# Patient Record
Sex: FEMALE | Race: WHITE | Employment: FULL TIME | URBAN - METROPOLITAN AREA
[De-identification: names, ages, dates, MRNs, and addresses within clinical notes are randomized per-mention and may not be internally consistent; named-entity substitution may affect disease eponyms.]

---

## 2017-01-20 ENCOUNTER — HOSPITAL ENCOUNTER (OUTPATIENT)
Dept: MAMMOGRAPHY | Facility: CLINIC | Age: 42
Discharge: HOME | End: 2017-01-20
Attending: INTERNAL MEDICINE | Admitting: INTERNAL MEDICINE
Payer: COMMERCIAL

## 2017-01-20 DIAGNOSIS — Z12.31 VISIT FOR SCREENING MAMMOGRAM: ICD-10-CM

## 2017-01-20 PROCEDURE — G0202 SCR MAMMO BI INCL CAD: HCPCS

## 2017-01-20 NOTE — PROGRESS NOTES
Quick Note:    This is to inform you regarding your test result.      Your mammogram result is satisfactory       Dr.Nasima Ivett MD,FACP    ______

## 2017-03-03 ENCOUNTER — OFFICE VISIT (OUTPATIENT)
Dept: FAMILY MEDICINE | Facility: CLINIC | Age: 42
End: 2017-03-03
Payer: COMMERCIAL

## 2017-03-03 VITALS
BODY MASS INDEX: 21.07 KG/M2 | TEMPERATURE: 96.8 F | WEIGHT: 139 LBS | OXYGEN SATURATION: 98 % | DIASTOLIC BLOOD PRESSURE: 77 MMHG | HEIGHT: 68 IN | RESPIRATION RATE: 16 BRPM | SYSTOLIC BLOOD PRESSURE: 114 MMHG | HEART RATE: 85 BPM

## 2017-03-03 DIAGNOSIS — L70.9 ACNE, UNSPECIFIED ACNE TYPE: Primary | ICD-10-CM

## 2017-03-03 DIAGNOSIS — L71.0 PERIORAL DERMATITIS: ICD-10-CM

## 2017-03-03 DIAGNOSIS — R79.89 ABNORMAL TSH: ICD-10-CM

## 2017-03-03 PROCEDURE — 84439 ASSAY OF FREE THYROXINE: CPT | Performed by: INTERNAL MEDICINE

## 2017-03-03 PROCEDURE — 99213 OFFICE O/P EST LOW 20 MIN: CPT | Performed by: INTERNAL MEDICINE

## 2017-03-03 PROCEDURE — 36415 COLL VENOUS BLD VENIPUNCTURE: CPT | Performed by: INTERNAL MEDICINE

## 2017-03-03 PROCEDURE — 84443 ASSAY THYROID STIM HORMONE: CPT | Performed by: INTERNAL MEDICINE

## 2017-03-03 PROCEDURE — 86376 MICROSOMAL ANTIBODY EACH: CPT | Performed by: INTERNAL MEDICINE

## 2017-03-03 RX ORDER — DOXYCYCLINE 100 MG/1
100 CAPSULE ORAL 2 TIMES DAILY
Qty: 28 CAPSULE | Refills: 1 | Status: SHIPPED | OUTPATIENT
Start: 2017-03-03 | End: 2017-03-17

## 2017-03-03 NOTE — PROGRESS NOTES
SUBJECTIVE:                                                    Domonique Masters is a 42 year old female who presents to clinic today for the following health issues:    Rash      Duration: 5+ weeks    Description  Location: Facial rash  Itching: mild    Intensity:  mild    Accompanying signs and symptoms: Redness; Very light itching after washing face; Burning-decreased once stopped using Retin-A; No swelling; No fever; Spreading rash    History (similar episodes/previous evaluation): None    Precipitating or alleviating factors:  New exposures:  Retin-A past 7 months-Stopped taking 9 days ago  Recent travel: no      Therapies tried and outcome: Aloe gel; Stopping Retin-A     Started using Retin-A July 2016 for mild acne.   Developed red rash-like patches on chin that spread to bilateral cheeks 1 month ago.    She scheduled an appointment with dermatology to evaluate this.   However they canceled her appointment and she was unable to reschedule.   Dermatology recommended her to discontinue Retin-A use.   Has never experienced this type of flare up before.   Wears the same tinted moisturizer for the past several years.   Has used desonide cream topically on face with some improvement.   Denies any exposure to new soaps or toothpaste.   Does not use benzoyl peroxide facial wash.   Has never taken doxycycline.     Occasionally has exacerbations on lips after eating a lot of sugar.   Mentions she feels more aggravated at work but denies any new stressors.   Able to sleep at night without issues.     Denies being pregnant or desire for pregnancy.     Problem list and histories reviewed & adjusted, as indicated.  Additional history: as documented    Patient Active Problem List   Diagnosis     Stricture and stenosis of esophagus     Abnormal glandular Papanicolaou smear of cervix     Benign hypertension     Anxiety     Elevated liver enzymes     Abnormal TSH     Benign essential tremor     CARDIOVASCULAR SCREENING; LDL GOAL  LESS THAN 130     GERD (gastroesophageal reflux disease)     IUD (intrauterine device) in place     Past Surgical History   Procedure Laterality Date     C appendectomy  1983     Gyn surgery       left ovarian cyst removed     Upper gi endoscopy performed       x2     Esophagoscopy, gastroscopy, duodenoscopy (egd), combined N/A 9/9/2014     Procedure: COMBINED ESOPHAGOSCOPY, GASTROSCOPY, DUODENOSCOPY (EGD), BIOPSY SINGLE OR MULTIPLE;  Surgeon: Gilberto Soto MD;  Location:  GI       Social History   Substance Use Topics     Smoking status: Never Smoker     Smokeless tobacco: Never Used     Alcohol use No      Comment: recovering alcoholic     Family History   Problem Relation Age of Onset     Genitourinary Problems Father      ESRD on dialysis     Cardiovascular Father      Cardiovascular Paternal Grandfather      Breast Cancer No family hx of      Cancer - colorectal No family hx of      Colon Cancer No family hx of          Current Outpatient Prescriptions   Medication Sig Dispense Refill     levonorgestrel (MIRENA) 20 MCG/24HR IUD 1 each by Intrauterine route once       LORazepam (ATIVAN) 0.5 MG tablet Take 1 tablet (0.5 mg) by mouth as needed 60 tablet 0     metoprolol (TOPROL-XL) 50 MG 24 hr tablet Take 1 tablet (50 mg) by mouth daily 90 tablet 3     MULTIVITAMIN OR 1 qd       LYSINE 1 qod       Allergies   Allergen Reactions     Retin-A [Tretinoin] Rash       ROS:  Constitutional, HEENT, cardiovascular, pulmonary, GI, , musculoskeletal, neuro, skin, endocrine and psych systems are negative, except as otherwise noted.    This document serves as a record of the services and decisions personally performed and made by Maria Elena Root MD. It was created on his/her behalf by Marta Aleman, a trained medical scribe. The creation of this document is based the provider's statements to the medical scribe.  Scribe Marta Aleman 3:31 PM, March 3, 2017    OBJECTIVE:                                                "     /77 (BP Location: Left arm, Patient Position: Left side, Cuff Size: Adult Regular)  Pulse 85  Temp 96.8  F (36  C) (Tympanic)  Resp 16  Ht 5' 8\" (1.727 m)  Wt 139 lb (63 kg)  LMP 02/25/2017  SpO2 98%  BMI 21.13 kg/m2  Body mass index is 21.13 kg/(m^2).  GENERAL: healthy, alert and no distress  SKIN: Small erythematous papules periorally and on cheeks    Diagnostic Test Results:  none      ASSESSMENT/PLAN:                                                    Domonique was seen today for derm problem.    Diagnoses and all orders for this visit:    Acne, unspecified acne type  Acne exacerbation likely secondary to Retin-A use.   Discontinue Retin-A which was recommended by her dermatologist also.  Discussed acne etiology and aggravating factors.   Steroid use not recommended - educated patient about this.   Recommended patient to complete entire doxycycline course.   Denies pregnancy or plan for future pregnency  If no improvement, will refer to dermatology.   -     doxycycline Monohydrate 100 MG CAPS; Take 1 capsule (100 mg) by mouth 2 times daily for 14 days    Perioral dermatitis  As above.   -     doxycycline Monohydrate 100 MG CAPS; Take 1 capsule (100 mg) by mouth 2 times daily for 14 days    Abnormal TSH  Patient has standing orders for TSH - completing labs today.   -     TSH with free T4 reflex  -     Thyroid peroxidase antibody          Patient Instructions   I am prescribing you an antibiotic.   Use benzoyl peroxide facial washes.   Discontinue use of Retin-A.   If your symptoms do not improve, call me and let me know so I can refer you to dermatology. They will accommodate you with my referral.            The information in this document, created by the medical scribe for me, accurately reflects the services I personally performed and the decisions made by me. I have reviewed and approved this document for accuracy prior to leaving the patient care area.  Maria Elena Root MD  3:31 PM, " 03/03/17    Maria Elena Root MD  [facility]

## 2017-03-03 NOTE — NURSING NOTE
"Chief Complaint   Patient presents with     Derm Problem       Initial /77 (BP Location: Left arm, Patient Position: Left side, Cuff Size: Adult Regular)  Pulse 85  Temp 96.8  F (36  C) (Tympanic)  Resp 16  Ht 5' 8\" (1.727 m)  Wt 139 lb (63 kg)  LMP 02/25/2017  SpO2 98%  BMI 21.13 kg/m2 Estimated body mass index is 21.13 kg/(m^2) as calculated from the following:    Height as of this encounter: 5' 8\" (1.727 m).    Weight as of this encounter: 139 lb (63 kg).  Medication Reconciliation: complete   Keeley Almonte CMA (AAMA)      "

## 2017-03-03 NOTE — MR AVS SNAPSHOT
After Visit Summary   3/3/2017    Domonique Masters    MRN: [id number]           Patient Information     Date Of Birth          1975        Visit Information        Provider Department      3/3/2017 3:30 PM Maria Elena Root MD [facility]        Today's Diagnoses     Acne, unspecified acne type    -  1    Perioral dermatitis          Care Instructions    I am prescribing you an antibiotic.   Use benzoyl peroxide facial washes.   Discontinue use of Retin-A.   If your symptoms do not improve, call me and let me know so I can refer you to dermatology. They will accommodate you with my referral.       Acne  Acne is an inflammatory condition of the skin. During adolescence (especially in boys) there is an increase in production of skin oils on the face, neck, chest, upper back, and upper arms. These oils can block hair follicles and allow an overgrowth of normal bacteria. This results in acne.  Mild acne causes whiteheads, blackheads, and pimples. More severe acne causes cysts and scarring. Acne usually clears up by your mid-20 s.  These factors can make acne worse:    Oil-based cosmetics    Heavy sweating    Frequent or hard scrubbing of the skin can irritate the acne lesions    Skin rubbing against helmets, shoulder pads, turtlenecks, and bra straps    Certain types of birth control pills  Home care  Here are some tips to help care for acne:  1. Treatments may include topical products (creams, lotions, or gels), oral antibiotics, and other medicines.  2. Follow the treatment plan advised by your doctor. It usually takes 2 to 3 months to see a result.  3. Switching from oil-based to water-based makeup may improve acne in girls.  Follow-up care  Follow up with your doctor or as advised by our staff. For more information:    American Academy of Dermatology  www.skincarephysicians.com/acnenet/acne.html  When to seek medical care  Get medical attention if you have acne cysts that get larger or more  painful.    7878-2165 [employer]. [address] [place] [address], [place] [address]. All rights reserved. This information is not intended as a substitute for professional medical care. Always follow your healthcare professional's instructions.              Follow-ups after your visit        Your next 10 appointments already scheduled     Mar 03, 2017  4:15 PM CST   LAB with CS LAB   [facility] [place] ([facility])    [address] [facility] [address] [phone]           Patient must bring picture ID.  Patient should be prepared to give a urine specimen  Please do not eat 10-12 hours before your appointment if you are coming in fasting for labs on lipids, cholesterol, or glucose (sugar).  Pregnant women should follow their Care Team instructions. Water with medications is okay. Do not drink coffee or other fluids.   If you have concerns about taking  your medications, please ask at office or if scheduling via [employer], send a message by clicking on Secure Messaging, Message Your Care Team.              Who to contact     If you have questions or need follow up information about today's clinic visit or your schedule please contact [facility] directly at [phone].  Normal or non-critical lab and imaging results will be communicated to you by [employer], letter or phone within 4 business days after the clinic has received the results. If you do not hear from us within 7 days, please contact the clinic through [employer] or phone. If you have a critical or abnormal lab result, we will notify you by phone as soon as possible.  Submit refill requests through [employer] or call your pharmacy and they will forward the refill request to us. Please allow 3 business days for your refill to be completed.          Additional Information About Your Visit        [employer] Information     [employer] gives you secure access to your electronic health record. If you see a primary care  "provider, you can also send messages to your care team and make appointments. If you have questions, please call your primary care clinic.  If you do not have a primary care provider, please call [phone] and they will assist you.        Care EveryWhere ID     This is your Care EveryWhere ID. This could be used by other organizations to access your [facility] medical records  [id number]        Your Vitals Were     Pulse Temperature Respirations Height Last Period Pulse Oximetry    85 96.8  F (36  C) (Tympanic) 16 5' 8\" (1.727 m) 02/25/2017 98%    BMI (Body Mass Index)                   21.13 kg/m2            Blood Pressure from Last 3 Encounters:   03/03/17 114/77   12/08/16 117/75   12/02/15 106/67    Weight from Last 3 Encounters:   03/03/17 139 lb (63 kg)   12/08/16 145 lb (65.8 kg)   12/02/15 143 lb 8 oz (65.1 kg)              Today, you had the following     No orders found for display         Today's Medication Changes          These changes are accurate as of: 3/3/17  3:44 PM.  If you have any questions, ask your nurse or doctor.               Start taking these medicines.        Dose/Directions    doxycycline Monohydrate 100 MG Caps   Used for:  Acne, unspecified acne type, Perioral dermatitis   Started by:  Maria Elena Root MD        Dose:  100 mg   Take 1 capsule (100 mg) by mouth 2 times daily for 14 days   Quantity:  28 capsule   Refills:  1            Where to get your medicines      These medications were sent to [id number] - [place], [place] - [address] AT [facility] [address]  [facility] [address] [place] [address]    Hours:  24-hours Phone:  [phone]     doxycycline Monohydrate 100 MG Caps                Primary Care Provider Office Phone # Fax #    Maria Elena Root [phone]       [facility]    [address]  [facility] [address]        Thank you!     Thank you for choosing [facility]  for your " care. Our goal is always to provide you with excellent care. Hearing back from our patients is one way we can continue to improve our services. Please take a few minutes to complete the written survey that you may receive in the mail after your visit with us. Thank you!             Your Updated Medication List - Protect others around you: Learn how to safely use, store and throw away your medicines at www.disposemymeds.org.          This list is accurate as of: 3/3/17  3:44 PM.  Always use your most recent med list.                   Brand Name Dispense Instructions for use    doxycycline Monohydrate 100 MG Caps     28 capsule    Take 1 capsule (100 mg) by mouth 2 times daily for 14 days       levonorgestrel 20 MCG/24HR IUD    MIRENA     1 each by Intrauterine route once       LORazepam 0.5 MG tablet    ATIVAN    60 tablet    Take 1 tablet (0.5 mg) by mouth as needed       LYSINE      1 qod       metoprolol 50 MG 24 hr tablet    TOPROL-XL    90 tablet    Take 1 tablet (50 mg) by mouth daily       MULTIVITAMIN PO      1 qd

## 2017-03-03 NOTE — PATIENT INSTRUCTIONS
I am prescribing you an antibiotic.   Use benzoyl peroxide facial washes.   Discontinue use of Retin-A.   If your symptoms do not improve, call me and let me know so I can refer you to dermatology. They will accommodate you with my referral.       Acne  Acne is an inflammatory condition of the skin. During adolescence (especially in boys) there is an increase in production of skin oils on the face, neck, chest, upper back, and upper arms. These oils can block hair follicles and allow an overgrowth of normal bacteria. This results in acne.  Mild acne causes whiteheads, blackheads, and pimples. More severe acne causes cysts and scarring. Acne usually clears up by your mid-20 s.  These factors can make acne worse:    Oil-based cosmetics    Heavy sweating    Frequent or hard scrubbing of the skin can irritate the acne lesions    Skin rubbing against helmets, shoulder pads, turtlenecks, and bra straps    Certain types of birth control pills  Home care  Here are some tips to help care for acne:  1. Treatments may include topical products (creams, lotions, or gels), oral antibiotics, and other medicines.  2. Follow the treatment plan advised by your doctor. It usually takes 2 to 3 months to see a result.  3. Switching from oil-based to water-based makeup may improve acne in girls.  Follow-up care  Follow up with your doctor or as advised by our staff. For more information:    American Academy of Dermatology  www.skincarephysicians.com/acnenet/acne.html  When to seek medical care  Get medical attention if you have acne cysts that get larger or more painful.    1392-6618 [employer]. [address] [place] [address], [place] [address]. All rights reserved. This information is not intended as a substitute for professional medical care. Always follow your healthcare professional's instructions.

## 2017-03-06 LAB
T4 FREE SERPL-MCNC: 1.02 NG/DL (ref 0.76–1.46)
TSH SERPL DL<=0.005 MIU/L-ACNC: 4.91 MU/L (ref 0.4–4)

## 2017-03-07 LAB — THYROPEROXIDASE AB SERPL-ACNC: <10 IU/ML

## 2017-03-08 NOTE — PROGRESS NOTES
This is to inform you regarding your test result.    TSH which is thyroid hormone is elevated but free T 4 level is normal  You do not need medication at this point.  But we need to check your level again in 6 months  Anti TPO antibodies is negative       Dr.Nasima Ivett MD,FACP

## 2017-05-16 ENCOUNTER — OFFICE VISIT (OUTPATIENT)
Dept: FAMILY MEDICINE | Facility: CLINIC | Age: 42
End: 2017-05-16
Attending: INTERNAL MEDICINE
Payer: COMMERCIAL

## 2017-05-16 DIAGNOSIS — D18.01 CAPILLARY HEMANGIOMA OF SKIN: ICD-10-CM

## 2017-05-16 DIAGNOSIS — L70.0 ACNE VULGARIS: ICD-10-CM

## 2017-05-16 DIAGNOSIS — D48.5 NEOPLASM OF UNCERTAIN BEHAVIOR OF SKIN: ICD-10-CM

## 2017-05-16 DIAGNOSIS — Z85.828 HISTORY OF BASAL CELL CARCINOMA: ICD-10-CM

## 2017-05-16 DIAGNOSIS — L81.4 SOLAR LENTIGO: ICD-10-CM

## 2017-05-16 DIAGNOSIS — Z12.83 SKIN CANCER SCREENING: Primary | ICD-10-CM

## 2017-05-16 DIAGNOSIS — Z80.8 FAMILY HISTORY OF SKIN CANCER: ICD-10-CM

## 2017-05-16 DIAGNOSIS — D22.9 MULTIPLE BENIGN MELANOCYTIC NEVI: ICD-10-CM

## 2017-05-16 DIAGNOSIS — C44.712 BASAL CELL CARCINOMA OF RIGHT THIGH: ICD-10-CM

## 2017-05-16 DIAGNOSIS — L91.0 KELOID SCAR: ICD-10-CM

## 2017-05-16 PROCEDURE — 11300 SHAVE SKIN LESION 0.5 CM/<: CPT | Performed by: FAMILY MEDICINE

## 2017-05-16 PROCEDURE — 99000 SPECIMEN HANDLING OFFICE-LAB: CPT | Performed by: FAMILY MEDICINE

## 2017-05-16 PROCEDURE — 88305 TISSUE EXAM BY PATHOLOGIST: CPT | Performed by: FAMILY MEDICINE

## 2017-05-16 PROCEDURE — 99213 OFFICE O/P EST LOW 20 MIN: CPT | Mod: 25 | Performed by: FAMILY MEDICINE

## 2017-05-16 RX ORDER — SPIRONOLACTONE 50 MG/1
50 TABLET, FILM COATED ORAL DAILY
Qty: 90 TABLET | Refills: 3 | Status: CANCELLED | OUTPATIENT
Start: 2017-05-16

## 2017-05-16 NOTE — PATIENT INSTRUCTIONS
"FUTURE APPOINTMENTS  Follow up in 6 month(s) for a full-body skin cancer screening.  Follow up per pathology report.    Continue current acne cleansing regimen. Return to clinic if acne control becomes unsatisfactory.    WOUND CARE INSTRUCTIONS  1. After 24 hours, change dressing daily.  2. Wash hands before every dressing change.  3. Wash the wound area with a mild soap, then rinse  4. Gently pat dry with a sterile gauze or Q-tip.  5. Apply Vaseline or Aquaphor only over entire wound. Do NOT use Neosporin - as many people react to neomycin.  6. Finally, cover with a bandage or sterile non-stick gauze with micropore paper tape.  7. Repeat once daily until wound has healed.    Do not let the wound dry out.  The wound will heal faster and with better cosmetic results if routinely kept moist with step #5. It is a false belief that a wound heals better when it is exposed to air and allowed to dry out.      Soap, water and shampoo will not hurt this area.    Do not go swimming or take baths, but showering is encouraged.    Limit use of the area where the procedure was done for a few days to allow for optimal healing.    If you experience bleeding:  Repeat steps #2-5 and hold firm pressure on the area for 10 minutes without checking to see if the bleeding has stopped. \"Checking\" pulls off the protective wound clot and restarts the bleeding all over again. Re-apply pressure for 10 minutes if necessary to stop bleeding.  Use additional sterile gauze and tape to maintain pressure once bleeding has stopped.    Signs of Infection:  Infection can occur in any area where skin has been disrupted.  If you notice persistent redness, swelling, colored drainage, increasing pain, fever or other signs of infection, please call us at: [phone] and ask to have me or my colleague paged. We will call you back to discuss.    Pathology Results:  You will be notified, generally via letter or MyChart, in approximately 10 days. If there " is anything we need to discuss or further treatment needed, I will call you to discuss it.    Skin tissue can be some of the most challenging tissue for pathologists to examine, therefore we may use a specialist outside the [facility] [employer] system to read certain submitted tissue samples. When submitted to these outside specialists, [employer] will notify you that your tissue sample result has returned. However, this only means that the tissue sample has been sent to the specialist. These results are not available in [employer], so I will contact you when I receive the final report.    PATIENT INFORMATION : WOUNDS  During the healing process you will notice a number of changes. All wounds develop a small halo of redness surrounding the wound.  This means healing is occurring. Severe itching with extensive redness usually indicates sensitivity to the ointment or bandage tape used to dress the wound.  You should call our office if this develops.      Swelling  and/or discoloration around your surgical site is common, particularly when performed around the eye.    All wounds normally drain.  The larger the wound the more drainage there will be.  After 7-10 days, you will notice the wound beginning to shrink and new skin will begin to grow.  The wound is healed when you can see skin has formed over the entire area.  A healed wound has a healthy, shiny look to the surface and is red to dark pink in color to normalize.  Wounds may take approximately 4-6 weeks to heal.  Larger wounds may take 6-8 weeks. After the wound is healed you may discontinue dressing changes.    You may experience a sensation of tightness as your wound heals. This is normal and will gradually subside.    Your healed wound may be sensitive to temperature changes. This sensitivity improves with time, but if you re having a lot of discomfort, try to avoid temperature extremes.    Patients frequently experience itching after their wound appears to have healed because  "of the continue healing under the skin.  Plain Vaseline will help relieve the itching.    TIPS FOR AVOIDING SKIN CANCER AND PREMATURE SKIN AGING  DOs    Wear a wide-brimmed hat and sunglasses.     Wear sun-protective clothing.    [employer] and other [employer] To make sun protective clothing that is stylish, comfortable and cool.    [employer] and other [employer] To make UV arm sleeves suitable for golfing, gardening and other activities.      Wear sunscreen on your face every day, even in the winter. (UVA \"aging rays\" penetrates window glass and is just as strong in the winter as in the summer) Sunscreen with SPF > 30 is recommended.    Wear sunscreen on your body and re-apply every 2 hours when exposed to sun. Sunscreen with SPF > 50 is recommended.    You should use about 3 tablespoons of sunscreen to protect your whole body. Thus a typical eight ounce bottle of sunscreen should last 4 applications. Remember, that the SPF rating is compromised if you don t apply enough. Most people only apply 1/2 - 1/3 of the amount they need. Also don t forget areas such as your ears, feet, upper back and harder to reach places. Keep in mind that these amounts should be increased for larger body sizes.    Note that spray sunscreens are only for touch-up application, not as a base layer. Also, use spray sunscreens with caution around small children due to inhalation risk.    Product Recommendations:    Look for broad spectrum sunscreen (blocks both UVA and UVB).    Look for titanium dioxide and/or zinc oxide in the active ingredients, which are physical blockers as opposed to chemical blockers. Chemical-free sunscreens should not irritate the skin.    Good examples include: Blue Lizard, EltaMD, Vanicream, Solbar, CeraVe.     For sensitive skin, consider : Skin[employer]ica Essential Defense Mineral Shield Broad Spectrum SPF 35      Avoid combination products that include both sunscreen and insect repellant, as sunscreen should be " "applied every 2 hours, but insect repellant should not be applied as frequently.    Avoid products that include oxybenzone or retinyl palmitate.    For more information:  http://www.skincancer.org/prevention/sun-protection/sunscreen/sunscreens-safe-and-effective    DON'Ts    All tanning damages the skin. Aim for ivory skin year round and you will have less trouble with your skin in years to come. There is no merit in getting \"a base tan\" before a warm weather vacation, as any tanning indicates your body's response to sun damage.    Never use tanning beds. Tanning beds are associated with much higher risks of skin cancer.    Avoid mid-day sunshine (10 AM to 3 PM), if possible.    Stop smoking. Smokers have higher rates of skin cancer and also have premature skin wrinkling.    SKIN CANCER SELF-EXAM INSTRUCTIONS  Check every month in the mirror or with a household member. Be aware of any changes, especially bleeding or tenderness. Also, make sure to check your nails for color changes after removal of nail polish.    For melanoma, check for:  A - Asymmetry. One half unlike the other half.  B - Border. Irregular, scalloped, ragged, notched, blurred or poorly defined borders.  C - Color. Color variations from one area to another, with shades of tan, brown and/or black present. Sometimes white, red or blue.  D - Diameter. Greater than 6 mm (about the size of a pencil eraser). Any new growth of a mole should be concerning and be evaluated.  E - Evolving. A mole or skin lesion that looks different from the rest or is changing in size, shape or color.    For basal cell carcinoma and squamous cell carcinoma, check for:    Sores, shiny bumps, nodules, scaly lesions, or wart-like growths that are itchy, tender, crusting, scabbing, eroding, oozing or bleeding.    Open sores/wounds or reddish/irritated areas that do not heal within 2-3 weeks.    Scar-like areas that are white, yellow or waxy in color.  "

## 2017-05-16 NOTE — MR AVS SNAPSHOT
"              After Visit Summary   5/16/2017    Domonique Masters    MRN: [id number]           Patient Information     Date Of Birth          1975        Visit Information        Provider Department      5/16/2017 2:20 PM Joanna Mullins MD [facility] - Primary Care Skin        Today's Diagnoses     Skin cancer screening    -  1    Neoplasm of uncertain behavior of skin        Acne vulgaris        History of basal cell carcinoma        Family history of skin cancer        Capillary hemangioma of skin        Multiple benign melanocytic nevi        Keloid scar        Solar lentigo          Care Instructions    FUTURE APPOINTMENTS  Follow up in 1 year(s) for a full-body skin cancer screening.  Follow up per pathology report.    Continue current acne cleansing regimen. Return to clinic if acne control becomes unsatisfactory.    WOUND CARE INSTRUCTIONS  1. After 24 hours, change dressing daily.  2. Wash hands before every dressing change.  3. Wash the wound area with a mild soap, then rinse  4. Gently pat dry with a sterile gauze or Q-tip.  5. Apply Vaseline or Aquaphor only over entire wound. Do NOT use Neosporin - as many people react to neomycin.  6. Finally, cover with a bandage or sterile non-stick gauze with micropore paper tape.  7. Repeat once daily until wound has healed.    Do not let the wound dry out.  The wound will heal faster and with better cosmetic results if routinely kept moist with step #5. It is a false belief that a wound heals better when it is exposed to air and allowed to dry out.      Soap, water and shampoo will not hurt this area.    Do not go swimming or take baths, but showering is encouraged.    Limit use of the area where the procedure was done for a few days to allow for optimal healing.    If you experience bleeding:  Repeat steps #2-5 and hold firm pressure on the area for 10 minutes without checking to see if the bleeding has stopped. \"Checking\" pulls off the " protective wound clot and restarts the bleeding all over again. Re-apply pressure for 10 minutes if necessary to stop bleeding.  Use additional sterile gauze and tape to maintain pressure once bleeding has stopped.    Signs of Infection:  Infection can occur in any area where skin has been disrupted.  If you notice persistent redness, swelling, colored drainage, increasing pain, fever or other signs of infection, please call us at: [phone] and ask to have me or my colleague paged. We will call you back to discuss.    Pathology Results:  You will be notified, generally via letter or MyChart, in approximately 10 days. If there is anything we need to discuss or further treatment needed, I will call you to discuss it.    Skin tissue can be some of the most challenging tissue for pathologists to examine, therefore we may use a specialist outside the [facility] system to read certain submitted tissue samples. When submitted to these outside specialists, [facility] will notify you that your tissue sample result has returned. However, this only means that the tissue sample has been sent to the specialist. These results are not available in [facility], so I will contact you when I receive the final report.    PATIENT INFORMATION : WOUNDS  During the healing process you will notice a number of changes. All wounds develop a small halo of redness surrounding the wound.  This means healing is occurring. Severe itching with extensive redness usually indicates sensitivity to the ointment or bandage tape used to dress the wound.  You should call our office if this develops.      Swelling  and/or discoloration around your surgical site is common, particularly when performed around the eye.    All wounds normally drain.  The larger the wound the more drainage there will be.  After 7-10 days, you will notice the wound beginning to shrink and new skin will begin to grow.  The wound is healed when you can see skin has formed over the  "entire area.  A healed wound has a healthy, shiny look to the surface and is red to dark pink in color to normalize.  Wounds may take approximately 4-6 weeks to heal.  Larger wounds may take 6-8 weeks. After the wound is healed you may discontinue dressing changes.    You may experience a sensation of tightness as your wound heals. This is normal and will gradually subside.    Your healed wound may be sensitive to temperature changes. This sensitivity improves with time, but if you re having a lot of discomfort, try to avoid temperature extremes.    Patients frequently experience itching after their wound appears to have healed because of the continue healing under the skin.  Plain Vaseline will help relieve the itching.    TIPS FOR AVOIDING SKIN CANCER AND PREMATURE SKIN AGING  DOs    Wear a wide-brimmed hat and sunglasses.     Wear sun-protective clothing.    [employer] and other [employer] make sun protective clothing that is stylish, comfortable and cool.    [employer] and other Mr. Number make UV arm sleeves suitable for golfing, gardening and other activities.      Wear sunscreen on your face every day, even in the winter. (UVA \"aging rays\" penetrates window glass and is just as strong in the winter as in the summer) Sunscreen with SPF > 30 is recommended.    Wear sunscreen on your body and re-apply every 2 hours when exposed to sun. Sunscreen with SPF > 50 is recommended.    You should use about 3 tablespoons of sunscreen to protect your whole body. Thus a typical eight ounce bottle of sunscreen should last 4 applications. Remember, that the SPF rating is compromised if you don t apply enough. Most people only apply 1/2 - 1/3 of the amount they need. Also don t forget areas such as your ears, feet, upper back and harder to reach places. Keep in mind that these amounts should be increased for larger body sizes.    Note that spray sunscreens are only for touch-up application, not as a base layer. " "Also, use spray sunscreens with caution around small children due to inhalation risk.    Product Recommendations:    Look for broad spectrum sunscreen (blocks both UVA and UVB).    Look for titanium dioxide and/or zinc oxide in the active ingredients, which are physical blockers as opposed to chemical blockers. Chemical-free sunscreens should not irritate the skin.    Good examples include: Blue Lizard, EltaMD, Vanicream, Solbar, CeraVe.     For sensitive skin, consider : SkinMedica Essential Defense Mineral Shield Broad Spectrum SPF 35      Avoid combination products that include both sunscreen and insect repellant, as sunscreen should be applied every 2 hours, but insect repellant should not be applied as frequently.    Avoid products that include oxybenzone or retinyl palmitate.    For more information:  http://www.skincancer.org/prevention/sun-protection/sunscreen/sunscreens-safe-and-effective    DON'Ts    All tanning damages the skin. Aim for ivory skin year round and you will have less trouble with your skin in years to come. There is no merit in getting \"a base tan\" before a warm weather vacation, as any tanning indicates your body's response to sun damage.    Never use tanning beds. Tanning beds are associated with much higher risks of skin cancer.    Avoid mid-day sunshine (10 AM to 3 PM), if possible.    Stop smoking. Smokers have higher rates of skin cancer and also have premature skin wrinkling.    SKIN CANCER SELF-EXAM INSTRUCTIONS  Check every month in the mirror or with a household member. Be aware of any changes, especially bleeding or tenderness. Also, make sure to check your nails for color changes after removal of nail polish.    For melanoma, check for:  A - Asymmetry. One half unlike the other half.  B - Border. Irregular, scalloped, ragged, notched, blurred or poorly defined borders.  C - Color. Color variations from one area to another, with shades of tan, brown and/or black present. Sometimes " white, red or blue.  D - Diameter. Greater than 6 mm (about the size of a pencil eraser). Any new growth of a mole should be concerning and be evaluated.  E - Evolving. A mole or skin lesion that looks different from the rest or is changing in size, shape or color.    For basal cell carcinoma and squamous cell carcinoma, check for:    Sores, shiny bumps, nodules, scaly lesions, or wart-like growths that are itchy, tender, crusting, scabbing, eroding, oozing or bleeding.    Open sores/wounds or reddish/irritated areas that do not heal within 2-3 weeks.    Scar-like areas that are white, yellow or waxy in color.        Follow-ups after your visit        Who to contact     If you have questions or need follow up information about today's clinic visit or your schedule please contact [facility] - PRIMARY CARE SKIN directly at [phone].  Normal or non-critical lab and imaging results will be communicated to you by [employer], letter or phone within 4 business days after the clinic has received the results. If you do not hear from us within 7 days, please contact the clinic through [employer] or phone. If you have a critical or abnormal lab result, we will notify you by phone as soon as possible.  Submit refill requests through [facility] or call your pharmacy and they will forward the refill request to us. Please allow 3 business days for your refill to be completed.          Additional Information About Your Visit        [employer] Information     [facility] gives you secure access to your electronic health record. If you see a primary care provider, you can also send messages to your care team and make appointments. If you have questions, please call your primary care clinic.  If you do not have a primary care provider, please call [phone] and they will assist you.        Care EveryWhere ID     This is your Care EveryWhere ID. This could be used by other organizations to access your [facility]  records  [id number]         Blood Pressure from Last 3 Encounters:   03/03/17 114/77   12/08/16 117/75   12/02/15 106/67    Weight from Last 3 Encounters:   03/03/17 139 lb (63 kg)   12/08/16 145 lb (65.8 kg)   12/02/15 143 lb 8 oz (65.1 kg)              Today, you had the following     No orders found for display       Primary Care Provider Office Phone # Fax #    Maria Elena LUPIS Root [phone]       [facility]    [address]  [place]                [place] [address]        Thank you!     Thank you for choosing [facility] - PR[facility]  for your care. Our goal is always to provide you with excellent care. Hearing back from our patients is one way we can continue to improve our services. Please take a few minutes to complete the written survey that you may receive in the mail after your visit with us. Thank you!             Your Updated Medication List - Protect others around you: Learn how to safely use, store and throw away your medicines at www.disposemymeds.org.          This list is accurate as of: 5/16/17  2:44 PM.  Always use your most recent med list.                   Brand Name Dispense Instructions for use    levonorgestrel 20 MCG/24HR IUD    MIRENA     1 each by Intrauterine route once       LORazepam 0.5 MG tablet    ATIVAN    60 tablet    Take 1 tablet (0.5 mg) by mouth as needed       LYSINE      1 qod       metoprolol 50 MG 24 hr tablet    TOPROL-XL    90 tablet    Take 1 tablet (50 mg) by mouth daily       MULTIVITAMIN PO      1 qd

## 2017-05-16 NOTE — PROGRESS NOTES
[facility] - PRIMARY CARE SKIN    CC : skin cancer screening (full-body)  SUBJECTIVE:                                                    Domonique Masters is a 42 year old female who presents to clinic today for a full-body skin exam because of changing lesions.    Bothersome lesions noticed by the patient or other skin concerns :  Issue One : Lesions on legs are dry. One lesion on the left leg has been growing in size; present for at least 25 years. A pink lesion is noticed on the right thigh.  Enlarging : YES - one is enlarging.  Bleeding : NO  Itchy or irritating : NO.  Pain or tenderness : NO - the pink lesion is never sore nor tender, but it is more noticeable in the winter.  Changing color : YES - one is pink in color.    Personal history of skin cancer : YES - basal cell carcinoma on chest.  Family history of skin cancer : YES - squamous cell carcinoma in grandmother.    Sun Exposure History  Previous history of significant sun exposure: grew up in [place]  Blistering sunburns : YES - 10 times  Tanning beds : YES.  Sunscreen Use : YES, SPF : 15.  UV-protective clothing use : NO  Wide-brimmed hats : NO  UV-protective sunglasses : YES  Avoids mid-day sun : NO    Issue Two : Acne  Domonique Masters is a 42 year old female who presents to clinic today because of problems with acne. She has an IUD Mirena placed.She is currently satisfied with her acne control and will continue with the current treatment.    Symptoms have been ongoing for : years.  The acne is primarily located on the : face.  Nodular acne occasionally forms.    Current treatment : OTC Benzoyl peroxide.  Response to treatment : acne control is satisfactory at this time.  Side effects noted : Excessive skin irritation with previous use of tretinoin.    Previous treatments include : Topical retinoids, oral antibiotics.    Skin type : combination of oily/dry  Family history of acne : YES - in mother.  Risk factors for acne : None.    Occupation :  [employer] (both indoor & outdoor).    Refer to electronic medical record (EMR) for past medical history and medications.    INTEGUMENTARY/SKIN: POSITIVE for changing lesions  ROS : 14 point review of systems was negative except the symptoms listed above in the HPI.    This document serves as a record of the services and decisions personally performed and made by Xochitl Mullins MD. It was created on her behalf by Nii Jacobs, a trained medical scribe.  The creation of this document is based on the scribe's personal observations and the provider's statements to the medical scribe.  Nii Jacobs, May 16, 2017 2:21 PM      OBJECTIVE:                                                    GENERAL: healthy, alert and no distress  SKIN: Berger Skin Type - I.  This patient was examined from the top of the head to the bottom of the feet  including scalp, face, neck, back, chest, breasts, buttocks, both arms, both legs, both hands, both feet, all 10 fingers and all 10 toes. The dermatoscope was used to help evaluate pigmented lesions.  Skin Pertinent Findings:  Arms : Multiple 2 mm - 4 mm in size, brown macules most consistent with benign nevi (melanocytic nevi). Multiple, brown, macule(s) most consistent with benign solar lentigo.  Face : few resolving inflammatory papules and single nodule  Mid chest : Keloid scar    Chest, Abdomen : Multiple, 2 mm - 6 mm in size, brown macules most consistent with benign nevi (melanocytic nevi). Scattered, slightly raised, red lesion(s) consistent with capillary hemangioma.    Legs : Multiple, 2 mm - 4 mm in size, brown macules most consistent with benign nevi (melanocytic nevi).    Left dorsal third toe : 3 mm in size, brown macule most consistent with a benign nevus.    Left thigh, midline, 8 cm superior to patella : 10 mm in size, raised, scaly lesion.    Back : Multiple, 2 mm - 6 mm in size, brown macules most consistent with benign nevi (melanocytic nevi).    Significant  "Findings:  Right anterior mid-thigh, 16 cm superior to edge of patella : 3 mm in size, erythematous, flat lesion. ? Basal cell carcinoma ? Squamous cell carcinoma ? Other    Face : Clear, no acneiform lesions.    Consent for digital photography  The patient was advised that digital photos will be taken today of Domonique Masters. The patient verbally consented to having these photos taken for purposes of documenting her condition. The patient understands that the images will be stored in her medical record.   Images in media tab of chart review      Diagnostic Test Results:  No results found for this or any previous visit (from the past 24 hour(s)).        ASSESSMENT:                                                      Encounter Diagnoses   Name Primary?     Skin cancer screening Yes     Acne vulgaris      History of basal cell carcinoma      Family history of skin cancer      Solar lentigo      Multiple benign melanocytic nevi      Capillary hemangioma of skin          PLAN:                                                    Patient Instructions   FUTURE APPOINTMENTS  Follow up in 1 year(s) for a full-body skin cancer screening.    TIPS FOR AVOIDING SKIN CANCER AND PREMATURE SKIN AGING  DOs    Wear a wide-brimmed hat and sunglasses.     Wear sun-protective clothing.    [employer] and other [employer] make sun protective clothing that is stylish, comfortable and cool.    [employer] and other [employer] make UV arm sleeves suitable for golfing, gardening and other activities.      Wear sunscreen on your face every day, even in the winter. (UVA \"aging rays\" penetrates window glass and is just as strong in the winter as in the summer) Sunscreen with SPF > 30 is recommended.    Wear sunscreen on your body and re-apply every 2 hours when exposed to sun. Sunscreen with SPF > 50 is recommended.    You should use about 3 tablespoons of sunscreen to protect your whole body. Thus a typical eight ounce bottle of sunscreen " "should last 4 applications. Remember, that the SPF rating is compromised if you don t apply enough. Most people only apply 1/2 - 1/3 of the amount they need. Also don t forget areas such as your ears, feet, upper back and harder to reach places. Keep in mind that these amounts should be increased for larger body sizes.    Note that spray sunscreens are only for touch-up application, not as a base layer. Also, use spray sunscreens with caution around small children due to inhalation risk.    Product Recommendations:    Look for broad spectrum sunscreen (blocks both UVA and UVB).    Look for titanium dioxide and/or zinc oxide in the active ingredients, which are physical blockers as opposed to chemical blockers. Chemical-free sunscreens should not irritate the skin.    Good examples include: Blue Lizard, EltaMD, Vanicream, Solbar, CeraVe.     For sensitive skin, consider : SkinMedica Essential Defense Mineral Shield Broad Spectrum SPF 35      Avoid combination products that include both sunscreen and insect repellant, as sunscreen should be applied every 2 hours, but insect repellant should not be applied as frequently.    Avoid products that include oxybenzone or retinyl palmitate.    For more information:  http://www.skincancer.org/prevention/sun-protection/sunscreen/sunscreens-safe-and-effective    DON'Ts    All tanning damages the skin. Aim for ivory skin year round and you will have less trouble with your skin in years to come. There is no merit in getting \"a base tan\" before a warm weather vacation, as any tanning indicates your body's response to sun damage.    Never use tanning beds. Tanning beds are associated with much higher risks of skin cancer.    Avoid mid-day sunshine (10 AM to 3 PM), if possible.    Stop smoking. Smokers have higher rates of skin cancer and also have premature skin wrinkling.    SKIN CANCER SELF-EXAM INSTRUCTIONS  Check every month in the mirror or with a household member. Be aware of " any changes, especially bleeding or tenderness. Also, make sure to check your nails for color changes after removal of nail polish.    For melanoma, check for:  A - Asymmetry. One half unlike the other half.  B - Border. Irregular, scalloped, ragged, notched, blurred or poorly defined borders.  C - Color. Color variations from one area to another, with shades of tan, brown and/or black present. Sometimes white, red or blue.  D - Diameter. Greater than 6 mm (about the size of a pencil eraser). Any new growth of a mole should be concerning and be evaluated.  E - Evolving. A mole or skin lesion that looks different from the rest or is changing in size, shape or color.    For basal cell carcinoma and squamous cell carcinoma, check for:    Sores, shiny bumps, nodules, scaly lesions, or wart-like growths that are itchy, tender, crusting, scabbing, eroding, oozing or bleeding.    Open sores/wounds or reddish/irritated areas that do not heal within 2-3 weeks.    Scar-like areas that are white, yellow or waxy in color.    The patient was counseled about sunscreens and sun avoidance. The patient was counseled to check the skin regularly and report any lesion that is new, changing, itching, scabbing, bleeding or otherwise bothersome. The patient was discharged ambulatory and in stable condition.    Recommendations : q1 year skin exams.      PROCEDURES:                                                    Name : Shave Excision  Indication : Excision of tissue for pathology evaluation.  Location(s) : Right anterior mid-thigh, 16 cm superior to edge of patella : 3 mm in size, erythematous, flat lesion. ? Basal cell carcinoma ? Squamous cell carcinoma ? Other.  Completed by : Xochitl Mullins MD  Photo Taken : no.  Anesthesia : Patient was anesthetized by infiltrating the area surrounding the lesion with 1% lidocaine.   epinephrine [id number] : Yes.  Buffered with bicarbonate : Yes.  Note : Discussed the risk of pain, infection,  scarring, hypo- or hyperpigmentation and recurrence or need for re-treatment. The benefits of treatment and alternative treatments were also discussed.    During this procedure, the universal protocol was utilized. The patient's identity was confirmed by no less than two patient identifiers, correct procedure was verified, correct site was verified and marked as applicable and a final pause was completed.    Sterile technique was used throughout the procedure. The skin was cleaned and prepped with surgical cleanser. Once adequate anesthesia was obtained, the lesion was removed with a deep scallop shave procedure. The specimen was sent to pathology.    Direct pressure and monsels's and monopolar cautery was applied for hemostasis. No bleeding was present upon the completion of the procedure. The wound was coated with antibacterial ointment. A dry sterile dressing was applied. Patient tolerated the procedure well and left in satisfactory condition.    Primary provider and referring provider will be informed regarding the tissue report when it returns.        The information in this document, created by the medical scribe for me, accurately reflects the services I personally performed and the decisions made by me. I have reviewed and approved this document for accuracy prior to leaving the patient care area.  Xochitl Mullins MD May 16, 2017 2:21 PM  [facility] - PRIMARY CARE SKIN

## 2017-05-22 ENCOUNTER — TELEPHONE (OUTPATIENT)
Dept: FAMILY MEDICINE | Facility: CLINIC | Age: 42
End: 2017-05-22

## 2017-05-22 NOTE — TELEPHONE ENCOUNTER
"Encounter : phonecall  Discussed lab results :       Pathology report : superficial basal cell carcinoma on the right thigh      Recommendations :  Discussed topical treatment, surgical excision. She would prefer to go with excision.  She will call for an appointment, 40 \" appointment    Yearly skin exams    "

## 2017-05-23 NOTE — PROGRESS NOTES
ADDENDUM:                                                    May 23, 2017 7:03 AM  Pathology report results:

## 2017-07-17 ENCOUNTER — OFFICE VISIT (OUTPATIENT)
Dept: FAMILY MEDICINE | Facility: CLINIC | Age: 42
End: 2017-07-17
Payer: COMMERCIAL

## 2017-07-17 DIAGNOSIS — C44.712 BASAL CELL CARCINOMA OF RIGHT THIGH: Primary | ICD-10-CM

## 2017-07-17 PROCEDURE — 99000 SPECIMEN HANDLING OFFICE-LAB: CPT | Performed by: FAMILY MEDICINE

## 2017-07-17 PROCEDURE — 11602 EXC TR-EXT MAL+MARG 1.1-2 CM: CPT | Performed by: FAMILY MEDICINE

## 2017-07-17 PROCEDURE — 88305 TISSUE EXAM BY PATHOLOGIST: CPT | Mod: 90 | Performed by: FAMILY MEDICINE

## 2017-07-17 NOTE — MR AVS SNAPSHOT
"              After Visit Summary   7/17/2017    Domonique Masters    MRN: [id number]           Patient Information     Date Of Birth          1975        Visit Information        Provider Department      7/17/2017 10:20 AM Joanna Mullins MD [facility] - Primary Care Skin        Care Instructions    Yearly skin check   Tylenol or Ibuprofen to control pain     FUTURE APPOINTMENTS  Follow up in 3 month(s) 10-14 days for Suture Removal, at any [facility]. If you go elsewhere, make sure to call ahead of time to get on their schedule.    WOUND CARE INSTRUCTIONS  1. After 24 hours, change dressing daily.  2. Wash hands before every dressing change.  3. Wash the wound area with a mild soap, then rinse  4. Gently pat dry with a sterile gauze or Q-tip.  5. Apply Vaseline or Aquaphor only over entire wound. Do NOT use Neosporin - as many people react to neomycin.  6. Finally, cover with a bandage or sterile non-stick gauze with micropore paper tape.  7. Repeat once daily until wound has healed.    Do not let the wound dry out.  The wound will heal faster and with better cosmetic results if routinely kept moist with step #5. It is a false belief that a wound heals better when it is exposed to air and allowed to dry out.      Soap, water and shampoo will not hurt this area.    Do not go swimming or take baths, but showering is encouraged.    Limit use of the area where the procedure was done for a few days to allow for optimal healing.    If you experience bleeding:  Repeat steps #2-5 and hold firm pressure on the area for 10 minutes without checking to see if the bleeding has stopped. \"Checking\" pulls off the protective wound clot and restarts the bleeding all over again. Re-apply pressure for 10 minutes if necessary to stop bleeding.  Use additional sterile gauze and tape to maintain pressure once bleeding has stopped.    Signs of Infection:  Infection can occur in any area where skin has been " disrupted.  If you notice persistent redness, swelling, colored drainage, increasing pain, fever or other signs of infection, please call us at: [phone] and ask to have me or my colleague paged. We will call you back to discuss.    Pathology Results:  You will be notified, generally via letter or MyChart, in approximately 10 days. If there is anything we need to discuss or further treatment needed, I will call you to discuss it.        PATIENT INFORMATION : WOUNDS  During the healing process you will notice a number of changes. All wounds develop a small halo of redness surrounding the wound.  This means healing is occurring. Severe itching with extensive redness usually indicates sensitivity to the ointment or bandage tape used to dress the wound.  You should call our office if this develops.      Swelling  and/or discoloration around your surgical site is common, particularly when performed around the eye.    All wounds normally drain.  The larger the wound the more drainage there will be.  After 7-10 days, you will notice the wound beginning to shrink and new skin will begin to grow.  The wound is healed when you can see skin has formed over the entire area.  A healed wound has a healthy, shiny look to the surface and is red to dark pink in color to normalize.  Wounds may take approximately 4-6 weeks to heal.  Larger wounds may take 6-8 weeks. After the wound is healed you may discontinue dressing changes.    You may experience a sensation of tightness as your wound heals. This is normal and will gradually subside.    Your healed wound may be sensitive to temperature changes. This sensitivity improves with time, but if you re having a lot of discomfort, try to avoid temperature extremes.    Patients frequently experience itching after their wound appears to have healed because of the continue healing under the skin.  Plain Vaseline will help relieve the itching.    Dr. Mullins's cell number  [phone]              Follow-ups after your visit        Who to contact     If you have questions or need follow up information about today's clinic visit or your schedule please contact [facility] - PRIMARY CARE SKIN directly at [phone].  Normal or non-critical lab and imaging results will be communicated to you by MyChart, letter or phone within 4 business days after the clinic has received the results. If you do not hear from us within 7 days, please contact the clinic through [employer] or phone. If you have a critical or abnormal lab result, we will notify you by phone as soon as possible.  Submit refill requests through [employer] or call your pharmacy and they will forward the refill request to us. Please allow 3 business days for your refill to be completed.          Additional Information About Your Visit        [employer] Information     [employer] gives you secure access to your electronic health record. If you see a primary care provider, you can also send messages to your care team and make appointments. If you have questions, please call your primary care clinic.  If you do not have a primary care provider, please call [phone] and they will assist you.        Care EveryWhere ID     This is your Care EveryWhere ID. This could be used by other organizations to access your [place] medical records  [id number]         Blood Pressure from Last 3 Encounters:   03/03/17 114/77   12/08/16 117/75   12/02/15 106/67    Weight from Last 3 Encounters:   03/03/17 63 kg (139 lb)   12/08/16 65.8 kg (145 lb)   12/02/15 65.1 kg (143 lb 8 oz)              Today, you had the following     No orders found for display       Primary Care Provider Office Phone # Fax #    Maria Elena Root [phone]       [facility]    [address]  [place]                [place] [address]        Equal Access to Services     NEPTALI MILLAN : Fernanda Dejesus, dolly vásquez, german schmidt  mesha espinalpam giuliaserge hinsonaajosh ah. Cherrie [facility] [phone].    ATENCIÓN: Si habla shelby, tiene a dinero disposición servicios gratuitos de asistencia lingüística. Dylan al [phone].    We comply with applicable federal civil rights laws and [place] laws. We do not discriminate on the basis of race, color, national origin, age, disability sex, sexual orientation or gender identity.            Thank you!     Thank you for choosing [facility] - PRIMARY CARE [facility]  for your care. Our goal is always to provide you with excellent care. Hearing back from our patients is one way we can continue to improve our services. Please take a few minutes to complete the written survey that you may receive in the mail after your visit with us. Thank you!             Your Updated Medication List - Protect others around you: Learn how to safely use, store and throw away your medicines at www.disposemymeds.org.          This list is accurate as of: 7/17/17 10:43 AM.  Always use your most recent med list.                   Brand Name Dispense Instructions for use Diagnosis    levonorgestrel 20 MCG/24HR IUD    MIRENA     1 each by Intrauterine route once        LORazepam 0.5 MG tablet    ATIVAN    60 tablet    Take 1 tablet (0.5 mg) by mouth as needed    Anxiety       LYSINE      1 qod        metoprolol 50 MG 24 hr tablet    TOPROL-XL    90 tablet    Take 1 tablet (50 mg) by mouth daily    Benign hypertension, Benign essential tremor       MULTIVITAMIN PO      1 qd

## 2017-07-17 NOTE — PROGRESS NOTES
[facility] - PRIMARY CARE SKIN    CC : Wide Excision   SUBJECTIVE:                                                    Domonique Masters is a 42 year old female who presents to clinic today for the removal of a  superficial BCC on the right anterior thigh. Pathology report confirmed superficial basal cell carcinoma .    Occupation : [employer] (ind[employer]).    Refer to electronic medical record (EMR) for past medical history and medications.      ROS : 14 point review of systems was negative except the symptoms listed above in the HPI.      This document serves as a record of the services and decisions personally performed and made by Xochitl Mullins MD. It was created on her behalf by Jay Lindsay, a trained medical scribe. The creation of this document is based on the scribe's personal observations and the provider's statements to the medical scribe.  Jay Lindsay July 17, 2017 6:54 AM    OBJECTIVE:                                                    GENERAL: healthy, alert and no distress  SKIN: Berger Skin Type - I.  Face, Legs, Hands and Fingers were examined. The dermatoscope was used to help evaluate pigmented lesions.  Skin Pertinent Findings:  Right anterior thigh 8 mm shave excision site , well healed    Diagnostic Test Results:  No results found for this or any previous visit (from the past 24 hour(s)).    .      ASSESSMENT:                                                      Encounter Diagnosis   Name Primary?     Basal cell carcinoma of right thigh Yes         PLAN:                                                    Patient Instructions   Yearly skin check   Recheck in 3 months  FUTURE APPOINTMENTS  Follow up in 14 days for Suture Removal, at any [place] clinic. If you go elsewhere, make sure to call ahead of time to get on their schedule.    WOUND CARE INSTRUCTIONS  1. After 24 hours, change dressing daily.  2. Wash hands before every dressing change.  3. Wash the wound area with a mild soap, then  "rinse  4. Gently pat dry with a sterile gauze or Q-tip.  5. Apply Vaseline or Aquaphor only over entire wound. Do NOT use Neosporin - as many people react to neomycin.  6. Finally, cover with a bandage or sterile non-stick gauze with micropore paper tape.  7. Repeat once daily until wound has healed.    Do not let the wound dry out.  The wound will heal faster and with better cosmetic results if routinely kept moist with step #5. It is a false belief that a wound heals better when it is exposed to air and allowed to dry out.      Soap, water and shampoo will not hurt this area.    Do not go swimming or take baths, but showering is encouraged.    Limit use of the area where the procedure was done for a few days to allow for optimal healing.    If you experience bleeding:  Repeat steps #2-5 and hold firm pressure on the area for 10 minutes without checking to see if the bleeding has stopped. \"Checking\" pulls off the protective wound clot and restarts the bleeding all over again. Re-apply pressure for 10 minutes if necessary to stop bleeding.  Use additional sterile gauze and tape to maintain pressure once bleeding has stopped.    Signs of Infection:  Infection can occur in any area where skin has been disrupted.  If you notice persistent redness, swelling, colored drainage, increasing pain, fever or other signs of infection, please call us at: [phone] and ask to have me or my colleague paged. We will call you back to discuss.    Pathology Results:  You will be notified, generally via letter or MyChart, in approximately 10 days. If there is anything we need to discuss or further treatment needed, I will call you to discuss it.        PATIENT INFORMATION : WOUNDS  During the healing process you will notice a number of changes. All wounds develop a small halo of redness surrounding the wound.  This means healing is occurring. Severe itching with extensive redness usually indicates sensitivity to the ointment or " bandage tape used to dress the wound.  You should call our office if this develops.      Swelling  and/or discoloration around your surgical site is common, particularly when performed around the eye.    All wounds normally drain.  The larger the wound the more drainage there will be.  After 7-10 days, you will notice the wound beginning to shrink and new skin will begin to grow.  The wound is healed when you can see skin has formed over the entire area.  A healed wound has a healthy, shiny look to the surface and is red to dark pink in color to normalize.  Wounds may take approximately 4-6 weeks to heal.  Larger wounds may take 6-8 weeks. After the wound is healed you may discontinue dressing changes.    You may experience a sensation of tightness as your wound heals. This is normal and will gradually subside.    Your healed wound may be sensitive to temperature changes. This sensitivity improves with time, but if you re having a lot of discomfort, try to avoid temperature extremes.    Patients frequently experience itching after their wound appears to have healed because of the continue healing under the skin.  Plain Vaseline will help relieve the itching.          Educational brochures given to patient : Patient instructions.      PROCEDURES:                                                    Name : Wide Excision  Indication : Wide excision of tissue for pathology evaluation of a BCC on right thigh.    Location(s) : Right anterior thigh .  Completed by : Xochitl Mullins MD  Photo Taken : no.  Anesthesia : Patient was anesthetized by infiltrating the area surrounding the lesion with 1% lidocaine.   epinephrine [id number]:[id number] : Yes.  Buffered with bicarbonate : Yes.  Note : Prior to procedure we discussed expectations for healing, risk of infection, and scar formation. Discussed other treatment options available. Discussed the risk of pain, infection, scarring, hypo- or hyperpigmentation and recurrence or need for  re-treatment. The benefits of treatment and alternative treatments were also discussed.    During this procedure, the universal protocol was utilized. The patient's identity was confirmed by no less than two patient identifiers, correct procedure was verified, correct site was verified and marked as applicable and a final pause was completed.    Sterile technique was used throughout the procedure. The skin was cleaned and prepped with Chloroprep. A 4 mm margin was marked out on each side of the lesion. Sterile drapes were laid out. Once adequate anesthesia was obtained, an elliptical incision was made with a #15 blade through the epidermis and dermis. The tissue specimen was placed in formalin and sent to pathology.    Direct pressure and monopolar cautery was applied for hemostasis. Edges were undermined with a Metzenbaum. Defect was approximated with 3-0 Vicryl and edges were approximated with 4-0 Prolene running cuticular stitch. Minimal bleeding occurred. Dressing was applied and patient left in satisfactory condition.    Widest diameter : 15 mm.  Length : 4.3 cm    Total number of stitches in closure of epidermis : Running cuticular     Primary provider and referring provider will be informed regarding the wound culture and tissue sample report when it returns.    Suture removal : 10-14 days.      The information in this document, created by the medical scribe for me, accurately reflects the services I personally performed and the decisions made by me. I have reviewed and approved this document for accuracy prior to leaving the patient care area.  Xochitl Mullins MD July 17, 2017 6:53 AM  [facility] - PRIMARY CARE SKIN

## 2017-07-17 NOTE — PATIENT INSTRUCTIONS
"Yearly skin check   Tylenol or Ibuprofen to control pain     FUTURE APPOINTMENTS  Follow up in 3 month(s) 10-14 days for Suture Removal, at any [place] clinic. If you go elsewhere, make sure to call ahead of time to get on their schedule.    WOUND CARE INSTRUCTIONS  1. After 24 hours, change dressing daily.  2. Wash hands before every dressing change.  3. Wash the wound area with a mild soap, then rinse  4. Gently pat dry with a sterile gauze or Q-tip.  5. Apply Vaseline or Aquaphor only over entire wound. Do NOT use Neosporin - as many people react to neomycin.  6. Finally, cover with a bandage or sterile non-stick gauze with micropore paper tape.  7. Repeat once daily until wound has healed.    Do not let the wound dry out.  The wound will heal faster and with better cosmetic results if routinely kept moist with step #5. It is a false belief that a wound heals better when it is exposed to air and allowed to dry out.      Soap, water and shampoo will not hurt this area.    Do not go swimming or take baths, but showering is encouraged.    Limit use of the area where the procedure was done for a few days to allow for optimal healing.    If you experience bleeding:  Repeat steps #2-5 and hold firm pressure on the area for 10 minutes without checking to see if the bleeding has stopped. \"Checking\" pulls off the protective wound clot and restarts the bleeding all over again. Re-apply pressure for 10 minutes if necessary to stop bleeding.  Use additional sterile gauze and tape to maintain pressure once bleeding has stopped.    Signs of Infection:  Infection can occur in any area where skin has been disrupted.  If you notice persistent redness, swelling, colored drainage, increasing pain, fever or other signs of infection, please call us at: [phone] and ask to have me or my colleague paged. We will call you back to discuss.    Pathology Results:  You will be notified, generally via letter or MyChart, in " approximately 10 days. If there is anything we need to discuss or further treatment needed, I will call you to discuss it.        PATIENT INFORMATION : WOUNDS  During the healing process you will notice a number of changes. All wounds develop a small halo of redness surrounding the wound.  This means healing is occurring. Severe itching with extensive redness usually indicates sensitivity to the ointment or bandage tape used to dress the wound.  You should call our office if this develops.      Swelling  and/or discoloration around your surgical site is common, particularly when performed around the eye.    All wounds normally drain.  The larger the wound the more drainage there will be.  After 7-10 days, you will notice the wound beginning to shrink and new skin will begin to grow.  The wound is healed when you can see skin has formed over the entire area.  A healed wound has a healthy, shiny look to the surface and is red to dark pink in color to normalize.  Wounds may take approximately 4-6 weeks to heal.  Larger wounds may take 6-8 weeks. After the wound is healed you may discontinue dressing changes.    You may experience a sensation of tightness as your wound heals. This is normal and will gradually subside.    Your healed wound may be sensitive to temperature changes. This sensitivity improves with time, but if you re having a lot of discomfort, try to avoid temperature extremes.    Patients frequently experience itching after their wound appears to have healed because of the continue healing under the skin.  Plain Vaseline will help relieve the itching.    Dr. Mullins's cell number [phone]

## 2017-07-25 ENCOUNTER — TELEPHONE (OUTPATIENT)
Dept: FAMILY MEDICINE | Facility: CLINIC | Age: 42
End: 2017-07-25

## 2017-07-25 NOTE — TELEPHONE ENCOUNTER
Encounter : voicemail    Left information regarding the pathology report :  Yes     Pathology report: right mid anterior thigh- no residual basal cell carcinoma      Recommendations: 3 month check of the site and yearly skin exams

## 2017-07-28 ENCOUNTER — ALLIED HEALTH/NURSE VISIT (OUTPATIENT)
Dept: NURSING | Facility: CLINIC | Age: 42
End: 2017-07-28
Payer: COMMERCIAL

## 2017-07-28 DIAGNOSIS — Z48.02 VISIT FOR SUTURE REMOVAL: Primary | ICD-10-CM

## 2017-07-28 PROCEDURE — 99207 ZZC NO CHARGE NURSE ONLY: CPT

## 2017-07-28 NOTE — NURSING NOTE
Domonique presents to the clinic today for  removal of sutures.  The patient has had the sutures in place for 11 days.    There has been no history of infection or drainage.    O: 1 running sutures are seen located on the right thigh.  The wound is healing well with no signs of infection.    Tetanus status is up to date.    A: Suture removal.    P:  All sutures were easily removed today.  Routine wound care discussed.  The patient will follow up as needed.    Maria Elena Salcedo RN  [facility]  [phone]

## 2017-07-28 NOTE — MR AVS SNAPSHOT
After Visit Summary   7/28/2017    Domonique Masters    MRN: [id number]           Patient Information     Date Of Birth          1975        Visit Information        Provider Department      7/28/2017 12:00 PM [facility]        Today's Diagnoses     Visit for suture removal    -  1       Follow-ups after your visit        Who to contact     If you have questions or need follow up information about today's clinic visit or your schedule please contact [facility] PRIMARY CARE SKIN directly at [phone].  Normal or non-critical lab and imaging results will be communicated to you by MyChart, letter or phone within 4 business days after the clinic has received the results. If you do not hear from us within 7 days, please contact the clinic through [email or web address] or phone. If you have a critical or abnormal lab result, we will notify you by phone as soon as possible.  Submit refill requests through [employer] or call your pharmacy and they will forward the refill request to us. Please allow 3 business days for your refill to be completed.          Additional Information About Your Visit        MyChart Information     [employer] gives you secure access to your electronic health record. If you see a primary care provider, you can also send messages to your care team and make appointments. If you have questions, please call your primary care clinic.  If you do not have a primary care provider, please call [phone] and they will assist you.        Care EveryWhere ID     This is your Care EveryWhere ID. This could be used by other organizations to access your [place] medical records  [id number]         Blood Pressure from Last 3 Encounters:   03/03/17 114/77   12/08/16 117/75   12/02/15 106/67    Weight from Last 3 Encounters:   03/03/17 139 lb (63 kg)   12/08/16 145 lb (65.8 kg)   12/02/15 143 lb 8 oz (65.1 kg)              Today, you had the following     No orders found  for display       Primary Care Provider Office Phone # Fax #    Maria Elena Root [phone]       [facility]    [address] [facility] [address]  [facility] [address]        Equal Access to Services     NEPTALI MILLAN : Hadii ron garcia abdoulo Anjelica, waaxda luqadaha, qaybta kaalmada adepamyada, mesha zoin hayaajosh zheng jimmieyair howe. So [facility] [phone].    ATENCIÓN: Si habla español, tiene a dinero disposición servicios gratuitos de asistencia lingüística. Llame al [phone].    We comply with applicable federal civil rights laws and [place] laws. We do not discriminate on the basis of race, color, national origin, age, disability sex, sexual orientation or gender identity.            Thank you!     Thank you for choosing [facility] - PRIMARY CARE [facility]  for your care. Our goal is always to provide you with excellent care. Hearing back from our patients is one way we can continue to improve our services. Please take a few minutes to complete the written survey that you may receive in the mail after your visit with us. Thank you!             Your Updated Medication List - Protect others around you: Learn how to safely use, store and throw away your medicines at www.disposemymeds.org.          This list is accurate as of: 7/28/17 12:22 PM.  Always use your most recent med list.                   Brand Name Dispense Instructions for use Diagnosis    levonorgestrel 20 MCG/24HR IUD    MIRENA     1 each by Intrauterine route once        LORazepam 0.5 MG tablet    ATIVAN    60 tablet    Take 1 tablet (0.5 mg) by mouth as needed    Anxiety       LYSINE      1 qod        metoprolol 50 MG 24 hr tablet    TOPROL-XL    90 tablet    Take 1 tablet (50 mg) by mouth daily    Benign hypertension, Benign essential tremor       MULTIVITAMIN PO      1 qd

## 2018-01-26 ENCOUNTER — HOSPITAL ENCOUNTER (OUTPATIENT)
Dept: MAMMOGRAPHY | Facility: CLINIC | Age: 43
Discharge: HOME | End: 2018-01-26
Attending: INTERNAL MEDICINE | Admitting: INTERNAL MEDICINE
Payer: COMMERCIAL

## 2018-01-26 DIAGNOSIS — Z12.31 SCREENING MAMMOGRAM, ENCOUNTER FOR: ICD-10-CM

## 2018-01-26 PROCEDURE — 77063 BREAST TOMOSYNTHESIS BI: CPT

## 2018-01-26 NOTE — PROGRESS NOTES
Diann Youssef,    This is to inform you regarding your test result.    Mammogram result is satisfactory .  Recommendation: annual mammography      Sincerely,      Dr.Nasima Ivett MD,FACP

## 2018-02-02 ENCOUNTER — OFFICE VISIT (OUTPATIENT)
Dept: FAMILY MEDICINE | Facility: CLINIC | Age: 43
End: 2018-02-02
Payer: COMMERCIAL

## 2018-02-02 VITALS
TEMPERATURE: 98.4 F | BODY MASS INDEX: 21.13 KG/M2 | DIASTOLIC BLOOD PRESSURE: 90 MMHG | SYSTOLIC BLOOD PRESSURE: 128 MMHG | OXYGEN SATURATION: 98 % | HEART RATE: 77 BPM | WEIGHT: 139 LBS

## 2018-02-02 DIAGNOSIS — Z12.4 CERVICAL CANCER SCREENING: ICD-10-CM

## 2018-02-02 DIAGNOSIS — G25.0 BENIGN ESSENTIAL TREMOR: ICD-10-CM

## 2018-02-02 DIAGNOSIS — Z00.00 ROUTINE HISTORY AND PHYSICAL EXAMINATION OF ADULT: Primary | ICD-10-CM

## 2018-02-02 DIAGNOSIS — F41.9 ANXIETY: ICD-10-CM

## 2018-02-02 DIAGNOSIS — I10 BENIGN HYPERTENSION: ICD-10-CM

## 2018-02-02 PROCEDURE — G0145 SCR C/V CYTO,THINLAYER,RESCR: HCPCS | Performed by: INTERNAL MEDICINE

## 2018-02-02 PROCEDURE — 99396 PREV VISIT EST AGE 40-64: CPT | Performed by: INTERNAL MEDICINE

## 2018-02-02 PROCEDURE — 87624 HPV HI-RISK TYP POOLED RSLT: CPT | Performed by: INTERNAL MEDICINE

## 2018-02-02 RX ORDER — METOPROLOL SUCCINATE 50 MG/1
50 TABLET, EXTENDED RELEASE ORAL DAILY
Qty: 90 TABLET | Refills: 3 | Status: SHIPPED | OUTPATIENT
Start: 2018-02-02 | End: 2019-02-15

## 2018-02-02 RX ORDER — LORAZEPAM 0.5 MG/1
0.5 TABLET ORAL PRN
Qty: 60 TABLET | Refills: 0 | Status: SHIPPED | OUTPATIENT
Start: 2018-02-02 | End: 2019-02-15

## 2018-02-02 NOTE — MR AVS SNAPSHOT
After Visit Summary   2/2/2018    Domonique Masters    MRN: [id number]           Patient Information     Date Of Birth          1975        Visit Information        Provider Department      2/2/2018 3:00 PM Maria Elena Root MD [facility]        Today's Diagnoses     Routine history and physical examination of adult    -  1    Cervical cancer screening        Anxiety        Benign hypertension        Benign essential tremor          Care Instructions      Preventive Health Recommendations  Female Ages 40 to 49    Yearly exam:     See your health care provider every year in order to  1. Review health changes.   2. Discuss preventive care.    3. Review your medicines if your doctor prescribed any.      Get a Pap test every three years (unless you have an abnormal result and your provider advises testing more often).      If you get Pap tests with HPV test, you only need to test every 5 years, unless you have an abnormal result. You do not need a Pap test if your uterus was removed (hysterectomy) and you have not had cancer.      You should be tested each year for STDs (sexually transmitted diseases), if you're at risk.       Ask your doctor if you should have a mammogram.      Have a colonoscopy (test for colon cancer) if someone in your family has had colon cancer or polyps before age 50.       Have a cholesterol test every 5 years.       Have a diabetes test (fasting glucose) after age 45. If you are at risk for diabetes, you should have this test every 3 years.    Shots: Get a flu shot each year. Get a tetanus shot every 10 years.     Nutrition:     Eat at least 5 servings of fruits and vegetables each day.    Eat whole-grain bread, whole-wheat pasta and brown rice instead of white grains and rice.    Talk to your provider about Calcium and Vitamin D.     Lifestyle    Exercise at least 150 minutes a week (an average of 30 minutes a day, 5 days a week). This will help you control your weight  and prevent disease.    Limit alcohol to one drink per day.    No smoking.     Wear sunscreen to prevent skin cancer.    See your dentist every six months for an exam and cleaning.      I refilled your prescriptions  Lorazepam is a controlled substance. It can be habit-forming. It should be taken as prescribed. Do not mix it with alcohol. Be careful with driving. Do not lose the prescription. Do not overuse this medication.  Schedule a fasting lab appointment at your earliest convenience  Follow up in 1 year  Seek sooner medical attention if there is any worsening of symptoms or problems          Follow-ups after your visit        Your next 10 appointments already scheduled     Feb 07, 2018  9:00 AM CST   [place] with Jo[place]ins [place]   [facility] [place]    [address] [place] [id number] [phone]           Bring a current list of meds and any records pertaining to this visit. For Physicals, please bring immunization records and any forms needing to be filled out. Please arrive 10 minutes early to complete paperwork.              Future tests that were ordered for you today     Open Future Orders        Priority Expected Expires Ordered    TSH with free T4 reflex Routine 2/5/2018 11/30/2018 2/2/2018    Lipid panel reflex to direct LDL Fasting Routine 2/5/2018 11/30/2018 2/2/2018    Glucose Routine 2/5/2018 11/30/2018 2/2/2018    CBC with platelets Routine 2/5/2018 11/30/2018 2/2/2018            Who to contact     If you have questions or need follow up information about today's clinic visit or your schedule please contact [facility] directly at [phone].  Normal or non-critical lab and imaging results will be communicated to you by MyChart, letter or phone within 4 business days after the clinic has received the results. If you do not hear from us within 7 days, please contact the clinic through MyChart or phone.  If you have a critical or abnormal lab result, we will notify you by phone as soon as possible.  Submit refill requests through [employer] or call your pharmacy and they will forward the refill request to us. Please allow 3 business days for your refill to be completed.          Additional Information About Your Visit        [employer] Information     [employer] gives you secure access to your electronic health record. If you see a primary care provider, you can also send messages to your care team and make appointments. If you have questions, please call your primary care clinic.  If you do not have a primary care provider, please call [phone] and they will assist you.        Care EveryWhere ID     This is your Care EveryWhere ID. This could be used by other organizations to access your [place] medical records  [id number]        Your Vitals Were     Pulse Temperature Pulse Oximetry Breastfeeding? BMI (Body Mass Index)       77 98.4  F (36.9  C) (Oral) 98% No 21.13 kg/m2        Blood Pressure from Last 3 Encounters:   02/02/18 128/90   03/03/17 114/77   12/08/16 117/75    Weight from Last 3 Encounters:   02/02/18 139 lb (63 kg)   03/03/17 139 lb (63 kg)   12/08/16 145 lb (65.8 kg)              We Performed the Following     HPV High Risk Types DNA Cervical     Pap imaged thin layer screen with HPV - recommended age 30 - 65 years (select HPV order below)          Where to get your medicines      These medications were sent to [id number] - [place], [place] - [address] AT [facility] [address], [place] [address]    Hours:  24-hours Phone:  [phone]     metoprolol succinate 50 MG 24 hr tablet         Some of these will need a paper prescription and others can be bought over the counter.  Ask your nurse if you have questions.     Bring a paper prescription for each of these medications     LORazepam 0.5 MG tablet          Primary Care Provider Office Phone # Fax #     Maria Elena Root [phone]       [address] [facility] [address]  [facility] [address]        Equal Access to Services     NEPTALI MILLAN : Hadii aad ku hadmitzythompson Dejesus, wabrooklynda alfredoelizabethha, qanicoleta kaalmada tovalisandro, mesha zoin hayaajosh bernardopam louis laKurtlynn howe. So [facility] [phone].    ATENCIÓN: Si habla español, tiene a dinero disposición servicios gratuitos de asistencia lingüística. Llame al [phone].    We comply with applicable federal civil rights laws and [place] laws. We do not discriminate on the basis of race, color, national origin, age, disability, sex, sexual orientation, or gender identity.            Thank you!     Thank you for choosing [facility]  for your care. Our goal is always to provide you with excellent care. Hearing back from our patients is one way we can continue to improve our services. Please take a few minutes to complete the written survey that you may receive in the mail after your visit with us. Thank you!             Your Updated Medication List - Protect others around you: Learn how to safely use, store and throw away your medicines at www.disposemymeds.org.          This list is accurate as of 2/2/18  3:34 PM.  Always use your most recent med list.                   Brand Name Dispense Instructions for use Diagnosis    levonorgestrel 20 MCG/24HR IUD    MIRENA     1 each by Intrauterine route once        LORazepam 0.5 MG tablet    ATIVAN    60 tablet    Take 1 tablet (0.5 mg) by mouth as needed    Anxiety       LYSINE      1 qod        metoprolol succinate 50 MG 24 hr tablet    TOPROL-XL    90 tablet    Take 1 tablet (50 mg) by mouth daily    Benign hypertension, Benign essential tremor       MULTIVITAMIN PO      1 qd

## 2018-02-02 NOTE — PROGRESS NOTES
SUBJECTIVE:   CC: Domonique Masters is an 43 year old woman who presents for preventive health visit.     Healthy Habits:    Do you get at least three servings of calcium containing foods daily (dairy, green leafy vegetables, etc.)? yes    Amount of exercise or daily activities, outside of work: 5 day(s) per week    Problems taking medications regularly No    Medication side effects: No    Have you had an eye exam in the past two years? yes    Do you see a dentist twice per year? yes    Do you have sleep apnea, excessive snoring or daytime drowsiness?no    Today's PHQ-2 Score:   PHQ-2 ( 1999 [employer]) 2/2/2018 12/8/2016   Q1: Little interest or pleasure in doing things 0 0   Q2: Feeling down, depressed or hopeless 0 0   PHQ-2 Score 0 0   Q1: Little interest or pleasure in doing things - -   Q2: Feeling down, depressed or hopeless - -   PHQ-2 Score - -     Abuse: Current or Past(Physical, Sexual or Emotional)- No  Do you feel safe in your environment - Yes    Social History   Substance Use Topics     Smoking status: Never Smoker     Smokeless tobacco: Never Used     Alcohol use No      Comment: recovering alcoholic     If you drink alcohol do you typically have >3 drinks per day or >7 drinks per week? No                     Reviewed orders with patient.  Reviewed health maintenance and updated orders accordingly - Yes  Labs reviewed in EPIC  Patient Active Problem List   Diagnosis     Stricture and stenosis of esophagus     Abnormal glandular Papanicolaou smear of cervix     Benign hypertension     Anxiety     Elevated liver enzymes     Abnormal TSH     Benign essential tremor     CARDIOVASCULAR SCREENING; LDL GOAL LESS THAN 130     GERD (gastroesophageal reflux disease)     IUD (intrauterine device) in place     History of basal cell carcinoma     Family history of skin cancer     Past Surgical History:   Procedure Laterality Date     C APPENDECTOMY  1983     ESOPHAGOSCOPY, GASTROSCOPY, DUODENOSCOPY (EGD), COMBINED N/A  9/9/2014    Procedure: COMBINED ESOPHAGOSCOPY, GASTROSCOPY, DUODENOSCOPY (EGD), BIOPSY SINGLE OR MULTIPLE;  Surgeon: Gilberto Soto MD;  Location:  GI     GYN SURGERY      left ovarian cyst removed     UPPER GI ENDOSCOPY PERFORMED      x2       Social History   Substance Use Topics     Smoking status: Never Smoker     Smokeless tobacco: Never Used     Alcohol use No      Comment: recovering alcoholic     Family History   Problem Relation Age of Onset     Genitourinary Problems Father      ESRD on dialysis     Cardiovascular Father      Cardiovascular Paternal Grandfather      Breast Cancer No family hx of      Cancer - colorectal No family hx of      Colon Cancer No family hx of          Current Outpatient Prescriptions   Medication Sig Dispense Refill     metoprolol succinate (TOPROL-XL) 50 MG 24 hr tablet Take 1 tablet (50 mg) by mouth daily 90 tablet 3     LORazepam (ATIVAN) 0.5 MG tablet Take 1 tablet (0.5 mg) by mouth as needed 60 tablet 0     levonorgestrel (MIRENA) 20 MCG/24HR IUD 1 each by Intrauterine route once       MULTIVITAMIN OR 1 qd       LYSINE 1 qod       [DISCONTINUED] metoprolol (TOPROL-XL) 50 MG 24 hr tablet Take 1 tablet (50 mg) by mouth daily 90 tablet 3     Allergies   Allergen Reactions     Retin-A [Tretinoin] Rash       Patient under age 50, mutual decision reflected in health maintenance.    Pertinent mammograms are reviewed under the imaging tab.  History of abnormal Pap smear:   Last 3 Pap Results:   PAP (no units)   Date Value   12/02/2015 NIL   11/22/2005 NIL   06/08/2005 ASC-US (A)     Reviewed and updated as needed this visit by clinical staff  Tobacco  Allergies  Meds  Problems  Med Hx  Surg Hx  Fam Hx  Soc Hx      Reviewed and updated as needed this visit by Provider        Past Medical History:   Diagnosis Date     Abnormal glandular Papanicolaou smear of cervix JUNe 2005    Abn. Pap smear (cervix) - ASC-US      Abnormal TSH      Anxiety      Elevated liver enzymes       Hypertension      Stricture and stenosis of esophagus fall 2002    had EGD and dilation      ROS:  C: NEGATIVE for fever, chills, change in weight  I: NEGATIVE for worrisome rashes, moles or lesions  E: NEGATIVE for vision changes or irritation  ENT: NEGATIVE for ear, mouth and throat problems  R: NEGATIVE for significant cough or SOB  B: NEGATIVE for masses, tenderness or discharge  CV: NEGATIVE for chest pain, palpitations or peripheral edema  GI: NEGATIVE for nausea, abdominal pain, heartburn, or change in bowel habits  : NEGATIVE for unusual urinary or vaginal symptoms. Periods are regular.  M: NEGATIVE for significant arthralgias or myalgia  N: NEGATIVE for weakness, dizziness or paresthesias  P: NEGATIVE for changes in mood or affect    This document serves as a record of the services and decisions personally performed and made by Maria Elena Root MD. It was created on her behalf by Erin Rogers, a trained medical scribe. The creation of this document is based on the provider's statements to the medical scribe.  Erin Rogers 3:17 PM February 2, 2018    OBJECTIVE:   /90  Pulse 77  Temp 98.4  F (36.9  C) (Oral)  Wt 139 lb (63 kg)  SpO2 98%  Breastfeeding? No  BMI 21.13 kg/m2    EXAM:  GENERAL: healthy, alert and no distress  EYES: Eyes grossly normal to inspection, PERRL and conjunctivae and sclerae normal  HENT: ear canals and TM's normal, nose and mouth without ulcers or lesions  NECK: no adenopathy, no asymmetry, masses, or scars and thyroid normal to palpation  RESP: lungs clear to auscultation - no rales, rhonchi or wheezes  BREAST: normal without masses, tenderness or nipple discharge and no palpable axillary masses or adenopathy  CV: regular rate and rhythm, normal S1 S2, no S3 or S4, no murmur, click or rub, no peripheral edema and peripheral pulses strong  ABDOMEN: soft, nontender, no hepatosplenomegaly, no masses and bowel sounds normal   (female): normal female external  genitalia, normal urethral meatus, vaginal mucosa pink, moist, well rugated, and normal cervix/adnexa/uterus without masses or discharge, pap smear performed in office visit today  MS: no gross musculoskeletal defects noted, no edema  SKIN: numerous freckles and moles  NEURO: Normal strength and tone, mentation intact and speech normal  PSYCH: mentation appears normal, affect normal/bright    ASSESSMENT/PLAN:   Domonique was seen today for physical.    Diagnoses and all orders for this visit:    Routine history and physical examination of adult  -     TSH with free T4 reflex; Future  -     Lipid panel reflex to direct LDL Fasting; Future  -     Glucose; Future  -     CBC with platelets; Future  Patient came in today for a wellness visit  Up to date on mammogram  Up to date on immunizations    Cervical cancer screening  -     Pap imaged thin layer screen with HPV - recommended age 30 - 65 years (select HPV order below)  -     HPV High Risk Types DNA Cervical  Patient has had an abnormal pap smear in the past  However, recent pap smears have been normal  Pap smear again today  Declined for STD screening    Anxiety  -     LORazepam (ATIVAN) 0.5 MG tablet; Take 1 tablet (0.5 mg) by mouth as needed  Doing well  Taking Lorazepam, only as needed  Educated her that Lorazepam is a controlled substance. It can be habit-forming. It should be taken as prescribed. Do not mix it with alcohol. Be careful with driving. Do not lose the prescription. Do not overuse this medication.    Benign hypertension  -     metoprolol succinate (TOPROL-XL) 50 MG 24 hr tablet; Take 1 tablet (50 mg) by mouth daily  Doing well  Compliant with medication    Benign essential tremor  -     metoprolol succinate (TOPROL-XL) 50 MG 24 hr tablet; Take 1 tablet (50 mg) by mouth daily  Doing well  Compliant with medication    Numerous Moles  Patient follows up with dermatologist twice a year  She has had one freckle removed  She's considered high risk for skin  "cancer  I educated her about importance of sun screen    Patient Instructions   I refilled your prescriptions  Lorazepam is a controlled substance. It can be habit-forming. It should be taken as prescribed. Do not mix it with alcohol. Be careful with driving. Do not lose the prescription. Do not overuse this medication.  Schedule a fasting lab appointment at your earliest convenience  Follow up in 1 year  Seek sooner medical attention if there is any worsening of symptoms or problems          COUNSELING:   Reviewed preventive health counseling, as reflected in patient instructions       Regular exercise       Healthy diet/nutrition       Immunizations    Vaccinated for: Influenza and TDAP       reports that she has never smoked. She has never used smokeless tobacco.    Estimated body mass index is 21.13 kg/(m^2) as calculated from the following:    Height as of 3/3/17: 5' 8\" (1.727 m).    Weight as of this encounter: 139 lb (63 kg).   Weight management plan noted, stable and monitoring    Counseling Resources:  ATP IV Guidelines  Pooled Cohorts Equation Calculator  Breast Cancer Risk Calculator  FRAX Risk Assessment  ICSI Preventive Guidelines  Dietary Guidelines for Americans, 2010  USDA's MyPlate  ASA Prophylaxis  Lung CA Screening    The information in this document, created by the medical scribe for me, accurately reflects the services I personally performed and the decisions made by me. I have reviewed and approved this document for accuracy prior to leaving the patient care area.  February 2, 2018 3:35 PM    Maria Elena Root MD  [facility]  "

## 2018-02-02 NOTE — PATIENT INSTRUCTIONS
Preventive Health Recommendations  Female Ages 40 to 49    Yearly exam:     See your health care provider every year in order to  1. Review health changes.   2. Discuss preventive care.    3. Review your medicines if your doctor prescribed any.      Get a Pap test every three years (unless you have an abnormal result and your provider advises testing more often).      If you get Pap tests with HPV test, you only need to test every 5 years, unless you have an abnormal result. You do not need a Pap test if your uterus was removed (hysterectomy) and you have not had cancer.      You should be tested each year for STDs (sexually transmitted diseases), if you're at risk.       Ask your doctor if you should have a mammogram.      Have a colonoscopy (test for colon cancer) if someone in your family has had colon cancer or polyps before age 50.       Have a cholesterol test every 5 years.       Have a diabetes test (fasting glucose) after age 45. If you are at risk for diabetes, you should have this test every 3 years.    Shots: Get a flu shot each year. Get a tetanus shot every 10 years.     Nutrition:     Eat at least 5 servings of fruits and vegetables each day.    Eat whole-grain bread, whole-wheat pasta and brown rice instead of white grains and rice.    Talk to your provider about Calcium and Vitamin D.     Lifestyle    Exercise at least 150 minutes a week (an average of 30 minutes a day, 5 days a week). This will help you control your weight and prevent disease.    Limit alcohol to one drink per day.    No smoking.     Wear sunscreen to prevent skin cancer.    See your dentist every six months for an exam and cleaning.      I refilled your prescriptions  Lorazepam is a controlled substance. It can be habit-forming. It should be taken as prescribed. Do not mix it with alcohol. Be careful with driving. Do not lose the prescription. Do not overuse this medication.  Schedule a fasting lab appointment at your earliest  convenience  Follow up in 1 year  Seek sooner medical attention if there is any worsening of symptoms or problems

## 2018-02-02 NOTE — NURSING NOTE
"Chief Complaint   Patient presents with     Physical       Initial /90  Pulse 77  Temp 98.4  F (36.9  C) (Oral)  Wt 139 lb (63 kg)  SpO2 98%  Breastfeeding? No  BMI 21.13 kg/m2 Estimated body mass index is 21.13 kg/(m^2) as calculated from the following:    Height as of 3/3/17: 5' 8\" (1.727 m).    Weight as of this encounter: 139 lb (63 kg).  Medication Reconciliation: complete   Lisa Cummings CMA      "

## 2018-02-06 LAB
COPATH REPORT: NORMAL
PAP: NORMAL

## 2018-02-07 ENCOUNTER — OFFICE VISIT (OUTPATIENT)
Dept: FAMILY MEDICINE | Facility: CLINIC | Age: 43
End: 2018-02-07
Payer: COMMERCIAL

## 2018-02-07 DIAGNOSIS — D22.9 MULTIPLE BENIGN MELANOCYTIC NEVI: ICD-10-CM

## 2018-02-07 DIAGNOSIS — L81.3 CAFÉ AU LAIT SPOT: ICD-10-CM

## 2018-02-07 DIAGNOSIS — Z12.83 SKIN CANCER SCREENING: Primary | ICD-10-CM

## 2018-02-07 DIAGNOSIS — Z85.828 HISTORY OF BASAL CELL CARCINOMA: ICD-10-CM

## 2018-02-07 DIAGNOSIS — L91.0 KELOID SCAR: ICD-10-CM

## 2018-02-07 DIAGNOSIS — L81.4 SOLAR LENTIGO: ICD-10-CM

## 2018-02-07 DIAGNOSIS — L73.8 SEBACEOUS HYPERPLASIA OF FACE: ICD-10-CM

## 2018-02-07 PROCEDURE — 99214 OFFICE O/P EST MOD 30 MIN: CPT | Performed by: FAMILY MEDICINE

## 2018-02-07 NOTE — PROGRESS NOTES
[facility] - PRIMARY CARE SKIN    CC : skin cancer screening (full-body)  SUBJECTIVE:                                                    Domonique Masters is a 43 year old female who presents to clinic today for a full-body skin exam because of her history of skin cancer.    Bothersome lesions noticed by the patient or other skin concerns :  Issue One : Lesions on chest may be new vs possibly just more prominent in the winter.    Personal history of skin cancer : YES - basal cell carcinoma on right thigh, on chest.  Family history of skin cancer : YES - squamous cell carcinoma in grandmother.    Sun Exposure History  Previous history of significant sun exposure: grew up in [place]  Blistering sunburns : YES - 10 times  Tanning beds : YES.  Sunscreen Use : YES, SPF : 15.  UV-protective clothing use : NO  Wide-brimmed hats : NO  UV-protective sunglasses : YES  Avoids mid-day sun : NO    Occupation : [employer] (both indoor & outdoor).    Refer to electronic medical record (EMR) for past medical history and medications.    INTEGUMENTARY/SKIN: POSITIVE for changing lesions  ROS : 14 point review of systems was negative except the symptoms listed above in the HPI.    This document serves as a record of the services and decisions personally performed and made by Xochitl Mullins MD. It was created on her behalf by Nii Jacobs, a trained medical scribe.  The creation of this document is based on the scribe's personal observations and the provider's statements to the medical scribe.  Nii Jacobs, February 7, 2018 8:52 AM      OBJECTIVE:                                                    GENERAL: healthy, alert and no distress  SKIN: Berger Skin Type - I.  This patient was examined from the top of the head to the bottom of the feet  including scalp, face, neck, back, chest, breasts, buttocks, both arms, both legs, both hands, both feet, all 10 fingers and all 10 toes. The dermatoscope was used to help evaluate  pigmented lesions.  Skin Pertinent Findings:  Face : Scattered raised, flesh-colored, lesion with central depression consistent with sebaceous hyperplasia.    Right arm(s) and left arm(s) : Multiple brown, macule(s) most consistent with benign solar lentigo. Multiple 2-4 mm in size, brown macules most consistent with benign (melanocytic nevi).    Chest, Abdomen : Multiple 2-6 mm in size, brown macules most consistent with benign (melanocytic nevi).    Right umbilicus : 3 x 2 cm in size, brown macule most consistent with a cafe au lait spot.    Anterior & posterior legs : Multiple 2-5 mm in size, brown macules most consistent with benign (melanocytic nevi).    Left dorsal foot, base of third toe : Multiple 2-3 mm in size, brown macules most consistent with benign (melanocytic nevi).     Back : Multiple various sizes and shapes, brown macules most consistent with benign (melanocytic nevi)    Significant Findings:  Mid-anterior chest : keloid scar      Right anterior thigh : well-healed scar from re-excision of basal cell carcinoma.    Diagnostic Test Results:  none     MDM :  Continue q6 month skin exams but increase frequency to annual if significant findings trend down.      ASSESSMENT:                                                      Encounter Diagnoses   Name Primary?     Skin cancer screening Yes     History of basal cell carcinoma      Multiple benign melanocytic nevi      Solar lentigo      Sebaceous hyperplasia of face      Café au lait spot      Keloid scar          PLAN:                                                    Patient Instructions   FUTURE APPOINTMENTS  Follow up in 6 months for a full-body skin cancer screening.    SUN PROTECTION INSTRUCTIONS  Sun damage can lead to skin cancer and premature aging of the skin.      The best way to protect from sun damage to your skin is to avoid the sun during peak hours (10 am - 2 pm) even on overcast days.      Use UPF sun-protective clothing, which while  "more expensive initially provides longer lasting coverage without having to worry about remembering to re-apply.  1. Wear a wide-brimmed hat and sunglasses.   2. Wear sun-protective clothing.  [employer] and other [employer] make sun protective clothing that are stylish, comfortable and cool. [employer] and other [employer] make UV arm sleeves suitable for golfing, gardening and other activities.      Sunscreen instructions:  1. Use sunscreens with Zinc Oxide, Titanium Dioxide or Avobenzone to protect from UVA rays.  2. Use SPF 30-50+ to protect from UVB rays.  3. Re-apply every 2 hours even if water resistant.  4. Apply on your face every day even when cloudy and even in the winter. UVA \"aging rays\" penetrate window glass and are just as strong in the winter as in the summer.    Product Recommendations:    Good examples include: Blue Lizard, EltaMD, Solbar    Good daily moisturizers with SPF: Vanicream, CeraVe.    For sensitive skin, consider : SkinMedica Essential Defense Mineral Shield Broad Spectrum SPF 35      Never use tanning beds. Tanning beds are associated with much higher risks of skin cancer.    All tanning damages the skin. Aim for ivory skin year round and you will have less trouble with your skin in years to come. There is no merit in getting \"a base tan\" before a warm weather vacation, as any tanning indicates your body's response to sun damage.    Stop smoking. Smokers have higher rates of skin cancer and also have premature skin wrinkling.    FYI  You should use about 3 tablespoons of sunscreen to protect your whole body. Thus a typical eight ounce bottle of sunscreen should last 4 applications. Remember, that the SPF rating is compromised if you don t apply enough. Most people only apply 1/2 - 1/3 of the amount they need. Also don t forget areas such as your ears, feet, upper back and harder to reach places. Keep in mind that these amounts should be increased for larger body " sizes.    Sunscreens with titanium dioxide and/or zinc oxide in the active ingredients are physical blockers as opposed to chemical blockers. Chemical-free sunscreens should not irritate the skin.    Spray-on sunscreens may be used for touch-up application only, not as a base layer. Also, use with caution around small children due to inhalation risk.    Avoid retinyl palmitate products.    Avoid combination products that include both sunscreen and insect repellant, as sunscreen should be applied every 2 hours, but insect repellant should not be applied as frequently.    SPF means sun protection factor, which is just the degree to which the sunscreen can protect against UVB rays. There is no rating system for UVA rays. SPF is calculated as the time skin will burn when sunscreen is applied vs. skin without sunscreen.    Water resistant sunscreens should be re-applied every 1-2 hours.    For more information:  http://www.skincancer.org/prevention/sun-protection/sunscreen/sunscreens-safe-and-effective    SKIN CANCER SELF-EXAM INSTRUCTIONS  Check every month in the mirror or with a household member. Be aware of any changes, especially bleeding or tenderness. Also, make sure to check your nails for color changes after removal of nail polish.    For melanoma, check for:  A - Asymmetry. One half unlike the other half.  B - Border. Irregular, scalloped, ragged, notched, blurred or poorly defined borders.  C - Color. Color variations from one area to another, with shades of tan, brown and/or black present. Sometimes white, red or blue.  D - Diameter. Greater than 6 mm (about the size of a pencil eraser). Any new growth of a mole should be concerning and be evaluated.  E - Evolving. A mole or skin lesion that looks different from the rest or is changing in size, shape or color.    For basal cell carcinoma and squamous cell carcinoma, check for:    Sores, shiny bumps, nodules, scaly lesions, or wart-like growths that are itchy,  tender, crusting, scabbing, eroding, oozing or bleeding.    Open sores/wounds or reddish/irritated areas that do not heal within 2-3 weeks.    Scar-like areas that are white, yellow or waxy in color.    The patient was counseled about sunscreens and sun avoidance. The patient was counseled to check the skin regularly and report any lesion that is new, changing, itching, scabbing, bleeding or otherwise bothersome. The patient was discharged ambulatory and in stable condition.      PROCEDURES:                                                    None.    TT : 25 minutes.  CT : 15 minutes.      The information in this document, created by the medical scribe for me, accurately reflects the services I personally performed and the decisions made by me. I have reviewed and approved this document for accuracy prior to leaving the patient care area.  Xochitl Mullins MD February 7, 2018 8:52 AM  [facility] [place]

## 2018-02-07 NOTE — MR AVS SNAPSHOT
"              After Visit Summary   2/7/2018    Domonique Masters    MRN: [id number]           Patient Information     Date Of Birth          1975        Visit Information        Provider Department      2/7/2018 9:00 AM Joanna Mullins MD [facility] [place]        Today's Diagnoses     Skin cancer screening    -  1    History of basal cell carcinoma        Multiple benign melanocytic nevi        Solar lentigo        Sebaceous hyperplasia of face        Café au lait spot        Keloid scar          Care Instructions    FUTURE APPOINTMENTS  Follow up in 6 months for a full-body skin cancer screening.    SUN PROTECTION INSTRUCTIONS  Sun damage can lead to skin cancer and premature aging of the skin.      The best way to protect from sun damage to your skin is to avoid the sun during peak hours (10 am - 2 pm) even on overcast days.      Use UPF sun-protective clothing, which while more expensive initially provides longer lasting coverage without having to worry about remembering to re-apply.  1. Wear a wide-brimmed hat and sunglasses.   2. Wear sun-protective clothing.  [employer] and other [employer] make sun protective clothing that are stylish, comfortable and cool. [employer] and other [employer] make UV arm sleeves suitable for golfing, gardening and other activities.      Sunscreen instructions:  1. Use sunscreens with Zinc Oxide, Titanium Dioxide or Avobenzone to protect from UVA rays.  2. Use SPF 30-50+ to protect from UVB rays.  3. Re-apply every 2 hours even if water resistant.  4. Apply on your face every day even when cloudy and even in the winter. UVA \"aging rays\" penetrate window glass and are just as strong in the winter as in the summer.    Product Recommendations:    Good examples include: Blue Lizard, EltaMD, Solbar    Good daily moisturizers with SPF: Vanicream, CeraVe.    For sensitive skin, consider : SkinMedica Essential Defense Mineral Shield Broad Spectrum SPF " "35      Never use tanning beds. Tanning beds are associated with much higher risks of skin cancer.    All tanning damages the skin. Aim for ivory skin year round and you will have less trouble with your skin in years to come. There is no merit in getting \"a base tan\" before a warm weather vacation, as any tanning indicates your body's response to sun damage.    Stop smoking. Smokers have higher rates of skin cancer and also have premature skin wrinkling.    FYI  You should use about 3 tablespoons of sunscreen to protect your whole body. Thus a typical eight ounce bottle of sunscreen should last 4 applications. Remember, that the SPF rating is compromised if you don t apply enough. Most people only apply 1/2 - 1/3 of the amount they need. Also don t forget areas such as your ears, feet, upper back and harder to reach places. Keep in mind that these amounts should be increased for larger body sizes.    Sunscreens with titanium dioxide and/or zinc oxide in the active ingredients are physical blockers as opposed to chemical blockers. Chemical-free sunscreens should not irritate the skin.    Spray-on sunscreens may be used for touch-up application only, not as a base layer. Also, use with caution around small children due to inhalation risk.    Avoid retinyl palmitate products.    Avoid combination products that include both sunscreen and insect repellant, as sunscreen should be applied every 2 hours, but insect repellant should not be applied as frequently.    SPF means sun protection factor, which is just the degree to which the sunscreen can protect against UVB rays. There is no rating system for UVA rays. SPF is calculated as the time skin will burn when sunscreen is applied vs. skin without sunscreen.    Water resistant sunscreens should be re-applied every 1-2 hours.    For more information:  http://www.skincancer.org/prevention/sun-protection/sunscreen/sunscreens-safe-and-effective    SKIN CANCER SELF-EXAM " INSTRUCTIONS  Check every month in the mirror or with a household member. Be aware of any changes, especially bleeding or tenderness. Also, make sure to check your nails for color changes after removal of nail polish.    For melanoma, check for:  A - Asymmetry. One half unlike the other half.  B - Border. Irregular, scalloped, ragged, notched, blurred or poorly defined borders.  C - Color. Color variations from one area to another, with shades of tan, brown and/or black present. Sometimes white, red or blue.  D - Diameter. Greater than 6 mm (about the size of a pencil eraser). Any new growth of a mole should be concerning and be evaluated.  E - Evolving. A mole or skin lesion that looks different from the rest or is changing in size, shape or color.    For basal cell carcinoma and squamous cell carcinoma, check for:    Sores, shiny bumps, nodules, scaly lesions, or wart-like growths that are itchy, tender, crusting, scabbing, eroding, oozing or bleeding.    Open sores/wounds or reddish/irritated areas that do not heal within 2-3 weeks.    Scar-like areas that are white, yellow or waxy in color.          Follow-ups after your visit        Your next 10 appointments already scheduled     Feb 09, 2018  9:15 AM CST   LAB with CS LAB   [facility] ([facility])    [address] [place] [id number] [phone]           Please do not eat 10-12 hours before your appointment if you are coming in fasting for labs on lipids, cholesterol, or glucose (sugar). This does not apply to pregnant women. Water, hot tea and black coffee (with nothing added) are okay. Do not drink other fluids, diet soda or chew gum.              Who to contact     If you have questions or need follow up information about today's clinic visit or your schedule please contact [facility] directly at [phone].  Normal or non-critical lab and imaging results will be communicated to you by  MyChart, letter or phone within 4 business days after the clinic has received the results. If you do not hear from us within 7 days, please contact the clinic through [employer] or phone. If you have a critical or abnormal lab result, we will notify you by phone as soon as possible.  Submit refill requests through [employer] or call your pharmacy and they will forward the refill request to us. Please allow 3 business days for your refill to be completed.          Additional Information About Your Visit        [employer] Information     [employer] gives you secure access to your electronic health record. If you see a primary care provider, you can also send messages to your care team and make appointments. If you have questions, please call your primary care clinic.  If you do not have a primary care provider, please call [phone] and they will assist you.        Care EveryWhere ID     This is your Care EveryWhere ID. This could be used by other organizations to access your [facility] medical records  [id number]         Blood Pressure from Last 3 Encounters:   02/02/18 128/90   03/03/17 114/77   12/08/16 117/75    Weight from Last 3 Encounters:   02/02/18 139 lb (63 kg)   03/03/17 139 lb (63 kg)   12/08/16 145 lb (65.8 kg)              Today, you had the following     No orders found for display       Primary Care Provider Office Phone # Fax #    Maria Elena LUPIS Root [phone] [address] [facility] [address]  [place]                [place] [address]        Equal Access to Services     [facility]: Hadii aad ku hadmitzyo Anjelica, waaxda luqadaha, qaybta kaalmada kylie, mesha braun . So [facility] [phone].    ATENCIÓN: Si habla español, tiene a dinero disposición servicios gratuitos de asistencia lingüística. Llame al [phone].    We comply with applicable federal civil rights laws and [place] laws. We do not discriminate on the basis of race, color, national origin, age, disability, sex,  sexual orientation, or gender identity.            Thank you!     Thank you for choosing [facility]  for your care. Our goal is always to provide you with excellent care. Hearing back from our patients is one way we can continue to improve our services. Please take a few minutes to complete the written survey that you may receive in the mail after your visit with us. Thank you!             Your Updated Medication List - Protect others around you: Learn how to safely use, store and throw away your medicines at www.disposemymeds.org.          This list is accurate as of 2/7/18  9:01 AM.  Always use your most recent med list.                   Brand Name Dispense Instructions for use Diagnosis    levonorgestrel 20 MCG/24HR IUD    MIRENA     1 each by Intrauterine route once        LORazepam 0.5 MG tablet    ATIVAN    60 tablet    Take 1 tablet (0.5 mg) by mouth as needed    Anxiety       LYSINE      1 qod        metoprolol succinate 50 MG 24 hr tablet    TOPROL-XL    90 tablet    Take 1 tablet (50 mg) by mouth daily    Benign hypertension, Benign essential tremor       MULTIVITAMIN PO      1 qd

## 2018-02-07 NOTE — PATIENT INSTRUCTIONS
"FUTURE APPOINTMENTS  Follow up in 6 months for a full-body skin cancer screening.    SUN PROTECTION INSTRUCTIONS  Sun damage can lead to skin cancer and premature aging of the skin.      The best way to protect from sun damage to your skin is to avoid the sun during peak hours (10 am - 2 pm) even on overcast days.      Use UPF sun-protective clothing, which while more expensive initially provides longer lasting coverage without having to worry about remembering to re-apply.  1. Wear a wide-brimmed hat and sunglasses.   2. Wear sun-protective clothing.  [employer] and other [employer] make sun protective clothing that are stylish, comfortable and cool. [employer] and other [employer] make UV arm sleeves suitable for golfing, gardening and other activities.      Sunscreen instructions:  1. Use sunscreens with Zinc Oxide, Titanium Dioxide or Avobenzone to protect from UVA rays.  2. Use SPF 30-50+ to protect from UVB rays.  3. Re-apply every 2 hours even if water resistant.  4. Apply on your face every day even when cloudy and even in the winter. UVA \"aging rays\" penetrate window glass and are just as strong in the winter as in the summer.    Product Recommendations:    Good examples include: Blue Lizard, EltaMD, Solbar    Good daily moisturizers with SPF: Vanicream, CeraVe.    For sensitive skin, consider : SkinMedica Essential Defense Mineral Shield Broad Spectrum SPF 35      Never use tanning beds. Tanning beds are associated with much higher risks of skin cancer.    All tanning damages the skin. Aim for ivory skin year round and you will have less trouble with your skin in years to come. There is no merit in getting \"a base tan\" before a warm weather vacation, as any tanning indicates your body's response to sun damage.    Stop smoking. Smokers have higher rates of skin cancer and also have premature skin wrinkling.    FYI  You should use about 3 tablespoons of sunscreen to protect your whole body. Thus a " typical eight ounce bottle of sunscreen should last 4 applications. Remember, that the SPF rating is compromised if you don t apply enough. Most people only apply 1/2 - 1/3 of the amount they need. Also don t forget areas such as your ears, feet, upper back and harder to reach places. Keep in mind that these amounts should be increased for larger body sizes.    Sunscreens with titanium dioxide and/or zinc oxide in the active ingredients are physical blockers as opposed to chemical blockers. Chemical-free sunscreens should not irritate the skin.    Spray-on sunscreens may be used for touch-up application only, not as a base layer. Also, use with caution around small children due to inhalation risk.    Avoid retinyl palmitate products.    Avoid combination products that include both sunscreen and insect repellant, as sunscreen should be applied every 2 hours, but insect repellant should not be applied as frequently.    SPF means sun protection factor, which is just the degree to which the sunscreen can protect against UVB rays. There is no rating system for UVA rays. SPF is calculated as the time skin will burn when sunscreen is applied vs. skin without sunscreen.    Water resistant sunscreens should be re-applied every 1-2 hours.    For more information:  http://www.skincancer.org/prevention/sun-protection/sunscreen/sunscreens-safe-and-effective    SKIN CANCER SELF-EXAM INSTRUCTIONS  Check every month in the mirror or with a household member. Be aware of any changes, especially bleeding or tenderness. Also, make sure to check your nails for color changes after removal of nail polish.    For melanoma, check for:  A - Asymmetry. One half unlike the other half.  B - Border. Irregular, scalloped, ragged, notched, blurred or poorly defined borders.  C - Color. Color variations from one area to another, with shades of tan, brown and/or black present. Sometimes white, red or blue.  D - Diameter. Greater than 6 mm (about the  size of a pencil eraser). Any new growth of a mole should be concerning and be evaluated.  E - Evolving. A mole or skin lesion that looks different from the rest or is changing in size, shape or color.    For basal cell carcinoma and squamous cell carcinoma, check for:    Sores, shiny bumps, nodules, scaly lesions, or wart-like growths that are itchy, tender, crusting, scabbing, eroding, oozing or bleeding.    Open sores/wounds or reddish/irritated areas that do not heal within 2-3 weeks.    Scar-like areas that are white, yellow or waxy in color.

## 2018-02-07 NOTE — LETTER
2/7/2018         RE: Domonique Masters  [address] [place] [address]        Dear Colleague,    Thank you for referring your patient, Domonique Masters, to the [facility]. Please see a copy of my visit note below.    [facility] - PRIMARY CARE SKIN    CC : skin cancer screening (full-body)  SUBJECTIVE:                                                    Domonique Masters is a 43 year old female who presents to clinic today for a full-body skin exam because of her history of skin cancer.    Bothersome lesions noticed by the patient or other skin concerns :  Issue One : Lesions on chest may be new vs possibly just more prominent in the winter.    Personal history of skin cancer : YES - basal cell carcinoma on right thigh, on chest.  Family history of skin cancer : YES - squamous cell carcinoma in grandmother.    Sun Exposure History  Previous history of significant sun exposure: grew up in [place]  Blistering sunburns : YES - 10 times  Tanning beds : YES.  Sunscreen Use : YES, SPF : 15.  UV-protective clothing use : NO  Wide-brimmed hats : NO  UV-protective sunglasses : YES  Avoids mid-day sun : NO    Occupation : [employer] (both indoor & outdoor).    Refer to electronic medical record (EMR) for past medical history and medications.    INTEGUMENTARY/SKIN: POSITIVE for changing lesions  ROS : 14 point review of systems was negative except the symptoms listed above in the HPI.    This document serves as a record of the services and decisions personally performed and made by Xochitl Mullins MD. It was created on her behalf by Nii Jacobs, a trained medical scribe.  The creation of this document is based on the scribe's personal observations and the provider's statements to the medical scribe.  Nii Jacobs, February 7, 2018 8:52 AM      OBJECTIVE:                                                    GENERAL: healthy, alert and no distress  SKIN: Berger Skin Type - I.  This patient was examined  from the top of the head to the bottom of the feet  including scalp, face, neck, back, chest, breasts, buttocks, both arms, both legs, both hands, both feet, all 10 fingers and all 10 toes. The dermatoscope was used to help evaluate pigmented lesions.  Skin Pertinent Findings:  Face : Scattered raised, flesh-colored, lesion with central depression consistent with sebaceous hyperplasia.    Right arm(s) and left arm(s) : Multiple brown, macule(s) most consistent with benign solar lentigo. Multiple 2-4 mm in size, brown macules most consistent with benign (melanocytic nevi).    Chest, Abdomen : Multiple 2-6 mm in size, brown macules most consistent with benign (melanocytic nevi).    Right umbilicus : 3 x 2 cm in size, brown macule most consistent with a cafe au lait spot.    Anterior & posterior legs : Multiple 2-5 mm in size, brown macules most consistent with benign (melanocytic nevi).    Left dorsal foot, base of third toe : Multiple 2-3 mm in size, brown macules most consistent with benign (melanocytic nevi).     Back : Multiple various sizes and shapes, brown macules most consistent with benign (melanocytic nevi)    Significant Findings:  Mid-anterior chest : keloid scar      Right anterior thigh : well-healed scar from re-excision of basal cell carcinoma.    Diagnostic Test Results:  none     MDM :  Continue q6 month skin exams but increase frequency to annual if significant findings trend down.      ASSESSMENT:                                                      Encounter Diagnoses   Name Primary?     Skin cancer screening Yes     History of basal cell carcinoma      Multiple benign melanocytic nevi      Solar lentigo      Sebaceous hyperplasia of face      Café au lait spot      Keloid scar          PLAN:                                                    Patient Instructions   FUTURE APPOINTMENTS  Follow up in 6 months for a full-body skin cancer screening.    SUN PROTECTION INSTRUCTIONS  Sun damage can lead  "to skin cancer and premature aging of the skin.      The best way to protect from sun damage to your skin is to avoid the sun during peak hours (10 am - 2 pm) even on overcast days.      Use UPF sun-protective clothing, which while more expensive initially provides longer lasting coverage without having to worry about remembering to re-apply.  1. Wear a wide-brimmed hat and sunglasses.   2. Wear sun-protective clothing.  [employer] and other [employer] make sun protective clothing that are stylish, comfortable and cool. [employer] and other [employer] make UV arm sleeves suitable for golfing, gardening and other activities.      Sunscreen instructions:  1. Use sunscreens with Zinc Oxide, Titanium Dioxide or Avobenzone to protect from UVA rays.  2. Use SPF 30-50+ to protect from UVB rays.  3. Re-apply every 2 hours even if water resistant.  4. Apply on your face every day even when cloudy and even in the winter. UVA \"aging rays\" penetrate window glass and are just as strong in the winter as in the summer.    Product Recommendations:    Good examples include: Blue Lizard, EltaMD, Solbar    Good daily moisturizers with SPF: Vanicream, CeraVe.    For sensitive skin, consider : SkinMedica Essential Defense Mineral Shield Broad Spectrum SPF 35      Never use tanning beds. Tanning beds are associated with much higher risks of skin cancer.    All tanning damages the skin. Aim for ivory skin year round and you will have less trouble with your skin in years to come. There is no merit in getting \"a base tan\" before a warm weather vacation, as any tanning indicates your body's response to sun damage.    Stop smoking. Smokers have higher rates of skin cancer and also have premature skin wrinkling.    FYI  You should use about 3 tablespoons of sunscreen to protect your whole body. Thus a typical eight ounce bottle of sunscreen should last 4 applications. Remember, that the SPF rating is compromised if you don t apply " enough. Most people only apply 1/2 - 1/3 of the amount they need. Also don t forget areas such as your ears, feet, upper back and harder to reach places. Keep in mind that these amounts should be increased for larger body sizes.    Sunscreens with titanium dioxide and/or zinc oxide in the active ingredients are physical blockers as opposed to chemical blockers. Chemical-free sunscreens should not irritate the skin.    Spray-on sunscreens may be used for touch-up application only, not as a base layer. Also, use with caution around small children due to inhalation risk.    Avoid retinyl palmitate products.    Avoid combination products that include both sunscreen and insect repellant, as sunscreen should be applied every 2 hours, but insect repellant should not be applied as frequently.    SPF means sun protection factor, which is just the degree to which the sunscreen can protect against UVB rays. There is no rating system for UVA rays. SPF is calculated as the time skin will burn when sunscreen is applied vs. skin without sunscreen.    Water resistant sunscreens should be re-applied every 1-2 hours.    For more information:  http://www.skincancer.org/prevention/sun-protection/sunscreen/sunscreens-safe-and-effective    SKIN CANCER SELF-EXAM INSTRUCTIONS  Check every month in the mirror or with a household member. Be aware of any changes, especially bleeding or tenderness. Also, make sure to check your nails for color changes after removal of nail polish.    For melanoma, check for:  A - Asymmetry. One half unlike the other half.  B - Border. Irregular, scalloped, ragged, notched, blurred or poorly defined borders.  C - Color. Color variations from one area to another, with shades of tan, brown and/or black present. Sometimes white, red or blue.  D - Diameter. Greater than 6 mm (about the size of a pencil eraser). Any new growth of a mole should be concerning and be evaluated.  E - Evolving. A mole or skin lesion that  looks different from the rest or is changing in size, shape or color.    For basal cell carcinoma and squamous cell carcinoma, check for:    Sores, shiny bumps, nodules, scaly lesions, or wart-like growths that are itchy, tender, crusting, scabbing, eroding, oozing or bleeding.    Open sores/wounds or reddish/irritated areas that do not heal within 2-3 weeks.    Scar-like areas that are white, yellow or waxy in color.    The patient was counseled about sunscreens and sun avoidance. The patient was counseled to check the skin regularly and report any lesion that is new, changing, itching, scabbing, bleeding or otherwise bothersome. The patient was discharged ambulatory and in stable condition.      PROCEDURES:                                                    None.    TT : 25 minutes.  CT : 15 minutes.      The information in this document, created by the medical scribe for me, accurately reflects the services I personally performed and the decisions made by me. I have reviewed and approved this document for accuracy prior to leaving the patient care area.  Xochitl Mullins MD February 7, 2018 8:52 AM  [facility]        Again, thank you for allowing me to participate in the care of your patient.        Sincerely,        Joanna Mullins MD

## 2018-02-08 LAB
FINAL DIAGNOSIS: NORMAL
HPV HR 12 DNA CVX QL NAA+PROBE: NEGATIVE
HPV16 DNA SPEC QL NAA+PROBE: NEGATIVE
HPV18 DNA SPEC QL NAA+PROBE: NEGATIVE
SPECIMEN DESCRIPTION: NORMAL
SPECIMEN SOURCE CVX/VAG CYTO: NORMAL

## 2018-02-09 DIAGNOSIS — Z00.00 ROUTINE HISTORY AND PHYSICAL EXAMINATION OF ADULT: ICD-10-CM

## 2018-02-09 LAB
CHOLEST SERPL-MCNC: 178 MG/DL
ERYTHROCYTE [DISTWIDTH] IN BLOOD BY AUTOMATED COUNT: 13.4 % (ref 10–15)
GLUCOSE SERPL-MCNC: 82 MG/DL (ref 70–99)
HCT VFR BLD AUTO: 42.1 % (ref 35–47)
HDLC SERPL-MCNC: 66 MG/DL
HGB BLD-MCNC: 14.1 G/DL (ref 11.7–15.7)
LDLC SERPL CALC-MCNC: 92 MG/DL
MCH RBC QN AUTO: 29.1 PG (ref 26.5–33)
MCHC RBC AUTO-ENTMCNC: 33.5 G/DL (ref 31.5–36.5)
MCV RBC AUTO: 87 FL (ref 78–100)
NONHDLC SERPL-MCNC: 112 MG/DL
PLATELET # BLD AUTO: 223 10E9/L (ref 150–450)
RBC # BLD AUTO: 4.85 10E12/L (ref 3.8–5.2)
T4 FREE SERPL-MCNC: 0.89 NG/DL (ref 0.76–1.46)
TRIGL SERPL-MCNC: 102 MG/DL
TSH SERPL DL<=0.005 MIU/L-ACNC: 5.32 MU/L (ref 0.4–4)
WBC # BLD AUTO: 5.2 10E9/L (ref 4–11)

## 2018-02-09 PROCEDURE — 82947 ASSAY GLUCOSE BLOOD QUANT: CPT | Performed by: INTERNAL MEDICINE

## 2018-02-09 PROCEDURE — 84439 ASSAY OF FREE THYROXINE: CPT | Performed by: INTERNAL MEDICINE

## 2018-02-09 PROCEDURE — 84443 ASSAY THYROID STIM HORMONE: CPT | Performed by: INTERNAL MEDICINE

## 2018-02-09 PROCEDURE — 80061 LIPID PANEL: CPT | Performed by: INTERNAL MEDICINE

## 2018-02-09 PROCEDURE — 85027 COMPLETE CBC AUTOMATED: CPT | Performed by: INTERNAL MEDICINE

## 2018-02-09 PROCEDURE — 36415 COLL VENOUS BLD VENIPUNCTURE: CPT | Performed by: INTERNAL MEDICINE

## 2018-02-10 NOTE — PROGRESS NOTES
Diann Youssef,    This is to inform you regarding your test result.    CBC result which includes white count Hemoglobin and  Platelet Counts is normal.   Glucose which is your blood sugar is normal.  Your total cholesterol is normal.  HDL which is called good cholesterol is normal.  Your LDL cholesterol is normal.  This is often call bad cholesterol and high levels increase the risk for heart attacks and strokes.  Your triglycerides are normal.  TSH which is thyroid hormone is elevated but free T 4 level is normal  You do not need medication for this at this point.  But we need to check your level again in 4 months  Make lab appointment for repeat TSH testing in 4 months.    Sincerely,      Dr.Nasima Ivett MD,FACP

## 2018-04-24 ENCOUNTER — MYC MEDICAL ADVICE (OUTPATIENT)
Dept: FAMILY MEDICINE | Facility: CLINIC | Age: 43
End: 2018-04-24

## 2018-04-24 NOTE — TELEPHONE ENCOUNTER
I can see her tomorrow at 10:45  Or you can use my [facility] hospital follow up slot for Thursday.  Dr.Nasima Ivett MD

## 2018-05-01 ENCOUNTER — OFFICE VISIT (OUTPATIENT)
Dept: FAMILY MEDICINE | Facility: CLINIC | Age: 43
End: 2018-05-01
Payer: COMMERCIAL

## 2018-05-01 VITALS
DIASTOLIC BLOOD PRESSURE: 64 MMHG | BODY MASS INDEX: 22.5 KG/M2 | WEIGHT: 148 LBS | HEART RATE: 80 BPM | TEMPERATURE: 98.5 F | OXYGEN SATURATION: 99 % | SYSTOLIC BLOOD PRESSURE: 109 MMHG

## 2018-05-01 DIAGNOSIS — L70.9 ACNE, UNSPECIFIED ACNE TYPE: Primary | ICD-10-CM

## 2018-05-01 PROCEDURE — 99213 OFFICE O/P EST LOW 20 MIN: CPT | Performed by: INTERNAL MEDICINE

## 2018-05-01 RX ORDER — DOXYCYCLINE 100 MG/1
CAPSULE ORAL
Qty: 67 CAPSULE | Refills: 0 | Status: SHIPPED | OUTPATIENT
Start: 2018-05-01 | End: 2019-02-15

## 2018-05-01 NOTE — MR AVS SNAPSHOT
After Visit Summary   5/1/2018    Domonique Masters    MRN: [id number]           Patient Information     Date Of Birth          1975        Visit Information        Provider Department      5/1/2018 12:30 PM Maria Elena Root MD [facility]        Today's Diagnoses     Acne, unspecified acne type    -  1      Care Instructions      Adult Acne    If your skin is erupting with blemishes that you thought could only affect a teenager, you may have adult acne. This is acne in people over the age of 25. Acne in teenagers is more common in teen boys. Acne in adults is more common in women.  Acne is the term for oil-clogged pores. Pores are tiny openings on the skin that become inflamed and form blemishes. Adult acne blemishes show up mainly on the face. In women, blemishes tend to show up around the chin, mouth, jawline, and neck. In men, acne often affects the entire face. But the trunk and upper arms can also be involved.  What causes acne?  Male hormones (androgens) may cause acne in some people. Women with certain conditions such as polycystic ovarian syndrome may make too much male hormones. Acne in women often may happen just before their menstrual periods. If you had acne when you were a teenager or if others in your family have had acne, you may be at more risk for adult acne. The good news is that acne can be treated. Treatments can also decrease the scarring and changes to skin color caused by acne.  Types of acne  Acne happens when certain hair follicles are damaged. One or more of 4 things happen:    The hair follicle is blocked by dead cells and oil (sebum)    The follicle makes more oil (sebum) than normal    Bacteria (P. acnes) grow in the follicle    The follicle becomes irritated (inflamed)  Four types of blemishes can appear:    Whiteheads are round, white blemishes that form when hair follicles become clogged.    Blackheads are round, dark blemishes that form when whiteheads reach  the skin s surface and touch air.    Pimples are red, swollen bumps that form when plugged follicle walls break near the skin s surface.    Deep cysts are pus-filled pimples. They form when plugged follicle walls break deep within the skin. Acne cysts are often large and painful. In some cases, they also cause scars.  How treatment can help  The goals of treatment are to keep new acne blemishes from forming and to prevent scarring and changes in skin color. You will usually need a combination of treatments. You may need to use a treatment for at least 2 months to see if it works. This is because acne lesions take at least 8 weeks to develop.  Your treatment will depend on how serious your acne is. You and your healthcare provider can discuss the best way to treat and control it. In most cases, acne treatment includes:    Good skin care that doesn t damage or irritate skin    Medicines put on the skin (topical)    Antibiotics, hormones, or both  Often you will need to take several medicines at first. For some treatments, women must use a birth control method so they won t get pregnant while being treated.  Your healthcare provider may also remove blemishes or give you injections. If you have acne scars, you may need surgery or medicines to help improve the way your skin looks. Be sure you understand your treatment plan and any side effects it might cause. You will play an important role in the success of your treatment.  Date Last Reviewed: 2/1/2017 2000-2017 [employer]. [address] [place] [address], [place] [address]. All rights reserved. This information is not intended as a substitute for professional medical care. Always follow your healthcare professional's instructions.        Controlling Adult or Adolescent Acne     Look for water-based, oil-free skin care products. These are less likely to clog your pores.   You stand the best chance of controlling your acne if you follow your treatment plan. Be  patient. Acne often takes months to improve, not days or weeks. Ask your healthcare provider when you can expect your skin to look better. If you don t see results by your goal date, call your healthcare provider. He or she may want to give you some other type of treatment.  Using skin care products and cosmetics  Besides following your treatment plan, take care in choosing skin care products and cosmetics. The following tips may help:    Choose gentle, oil-free soaps and facial cleansers.    Avoid harsh acne scrubs, cleansers, or astringents. These types of products can irritate your skin.    Ask your healthcare provider before buying over-the-counter acne treatments. Some of these, such as those with benzoyl peroxide or salicylic acid, can work. But they often have side effects, such as skin getting too dry with treatments that are too strong.    Read labels on makeup and moisturizers. Choose those that are water based and oil free. Look for the term noncomedogenic. It means that the product won t clog your pores.  Caring for your skin  The right skin care routine can help keep your skin healthy. To take good care of your skin, try these tips:    Gently wash your face or other affected skin twice a day with a mild cleanser. Using your fingertips, smooth the cleanser over your skin. Rinse your skin well. Pat it dry.    If your healthcare provider has approved any over-the-counter acne medicine, use as directed. Use it after you wash your skin. Apply the medicine to all areas where you get acne. Don t just treat acne you can see now. New blemishes may be in progress but not in view yet. Treat all the skin.    Don t squeeze or pick blemishes. Acne blemishes may heal on their own. But squeezing can cause scarring. Scars will remain after acne blemishes heal.    Sponges, brushes, or other abrasive tools can irritate the skin. Avoid using them.    If you use soft sponges or cloths to apply your makeup, keep them  clean.    Try not to touch your face.    If possible, avoid clothing and equipment that blocks or rubs the face.  Getting good results  Learning more about acne is the first step toward controlling this condition. Know that acne that s being treated often gets worse at first before it improves. But with proper treatment and skin care, you can manage your acne and feel better about your skin.   Date Last Reviewed: 2/1/2017 2000-2017 [employer]. [address] [place] [address], [place] [address]. All rights reserved. This information is not intended as a substitute for professional medical care. Always follow your healthcare professional's instructions.                Follow-ups after your visit        Who to contact     If you have questions or need follow up information about today's clinic visit or your schedule please contact [facility] directly at [phone].  Normal or non-critical lab and imaging results will be communicated to you by StayTunedhart, letter or phone within 4 business days after the clinic has received the results. If you do not hear from us within 7 days, please contact the clinic through StayTunedhart or phone. If you have a critical or abnormal lab result, we will notify you by phone as soon as possible.  Submit refill requests through I-Stand or call your pharmacy and they will forward the refill request to us. Please allow 3 business days for your refill to be completed.          Additional Information About Your Visit        StayTunedharBetterCloud Information     [employer]-Stand gives you secure access to your electronic health record. If you see a primary care provider, you can also send messages to your care team and make appointments. If you have questions, please call your primary care clinic.  If you do not have a primary care provider, please call [phone] and they will assist you.        Care EveryWhere ID     This is your Care EveryWhere ID. This could be used by other organizations to  access your [place] medical records  [id number]        Your Vitals Were     Pulse Temperature Pulse Oximetry BMI (Body Mass Index)          80 98.5  F (36.9  C) (Tympanic) 99% 22.5 kg/m2         Blood Pressure from Last 3 Encounters:   05/01/18 109/64   02/02/18 128/90   03/03/17 114/77    Weight from Last 3 Encounters:   05/01/18 148 lb (67.1 kg)   02/02/18 139 lb (63 kg)   03/03/17 139 lb (63 kg)              Today, you had the following     No orders found for display         Today's Medication Changes          These changes are accurate as of 5/1/18 12:48 PM.  If you have any questions, ask your nurse or doctor.               Start taking these medicines.        Dose/Directions    doxycycline monohydrate 100 MG capsule   Used for:  Acne, unspecified acne type   Started by:  Maria Elena Root MD        Take twice daily for 7 days then take once daily for 8 weeks   Quantity:  67 capsule   Refills:  0            Where to get your medicines      Information about where to get these medications is not yet available     ! Ask your nurse or doctor about these medications     doxycycline monohydrate 100 MG capsule                Primary Care Provider Office Phone # Fax #    Maria Elena Root [phone] [address] [address]        Equal Access to Services     [facility] AH: Hadii ron ku hadasho Anjelica, waaxda luqadaha, qaybta kaalmada adeegyada, mesha braun . So [facility] [phone].    ATENCIÓN: Si habla español, tiene a dinero disposición servicios gratuitos de asistencia lingüística. Llame al [phone].    We comply with applicable federal civil rights laws and [place] laws. We do not discriminate on the basis of race, color, national origin, age, disability, sex, sexual orientation, or gender identity.            Thank you!     Thank you for choosing [facility]  for your care. Our goal is always to provide you with  excellent care. Hearing back from our patients is one way we can continue to improve our services. Please take a few minutes to complete the written survey that you may receive in the mail after your visit with us. Thank you!             Your Updated Medication List - Protect others around you: Learn how to safely use, store and throw away your medicines at www.disposemymeds.org.          This list is accurate as of 5/1/18 12:48 PM.  Always use your most recent med list.                   Brand Name Dispense Instructions for use Diagnosis    doxycycline monohydrate 100 MG capsule     67 capsule    Take twice daily for 7 days then take once daily for 8 weeks    Acne, unspecified acne type       levonorgestrel 20 MCG/24HR IUD    MIRENA     1 each by Intrauterine route once        LORazepam 0.5 MG tablet    ATIVAN    60 tablet    Take 1 tablet (0.5 mg) by mouth as needed    Anxiety       LYSINE      1 qod        metoprolol succinate 50 MG 24 hr tablet    TOPROL-XL    90 tablet    Take 1 tablet (50 mg) by mouth daily    Benign hypertension, Benign essential tremor       MULTIVITAMIN PO      1 qd

## 2018-05-01 NOTE — PROGRESS NOTES
SUBJECTIVE:   Domonique Masters is a 43 year old female who presents to clinic today for the following health issues:      Derm problem      Duration: ongoign    Description (location/character/radiation): cheek    Intensity:  moderate    Accompanying signs and symptoms:     History (similar episodes/previous evaluation): yes    Precipitating or alleviating factors: None    Therapies tried and outcome: None     Follows up with Dr. Joanna Mullins, a   Patient has had increasing cystic acne over past couple of months  She has had history of acne in the past  Reports benzoyl peroxide worked well for her  Retin-a didn't work for patient    Problem list and histories reviewed & adjusted, as indicated.  Additional history: as documented    Medications and Labs reviewed in EPIC    Reviewed and updated as needed this visit by clinical staff  Tobacco  Allergies  Meds  Problems  Med Hx  Surg Hx  Fam Hx  Soc Hx        Reviewed and updated as needed this visit by Provider         ROS:  Constitutional, HEENT, cardiovascular, pulmonary, GI, , musculoskeletal, neuro, skin, endocrine and psych systems are negative, except as otherwise noted.    POSITIVE for cystic acne on face    This document serves as a record of the services and decisions personally performed and made by Maria Elena Root MD. It was created on her behalf by Erin Rogers, a trained medical scribe. The creation of this document is based on the provider's statements to the medical scribe.  Erin Rogers 12:41 PM May 1, 2018    OBJECTIVE:     /64 (BP Location: Right arm, Cuff Size: Adult Regular)  Pulse 80  Temp 98.5  F (36.9  C) (Tympanic)  Wt 67.1 kg (148 lb)  SpO2 99%  BMI 22.5 kg/m2  Body mass index is 22.5 kg/(m^2).  GENERAL: healthy, alert and no distress  SKIN: multiple multicystic acne over her face  PSYCH: mentation appears normal, affect normal/bright    Diagnostic Test Results:  none     ASSESSMENT/PLAN:     Domonique  was seen today for derm problem.    Diagnoses and all orders for this visit:    Acne, unspecified acne type  -     doxycycline monohydrate 100 MG capsule; Take twice daily for 7 days then take once daily for 8 weeks  Patient has had increasing cystic acne over past couple of months  She has had history of acne in the past  Reports benzoyl peroxide worked well for her  Explained to patient that doxycyline is contraindicated in pregnancy  Patient has Mirena IUD and isn't planning for pregnancy  Advised her to continue benzoyl peroxide washes  Patient has used doxycycline in the past  It worked well for her  Patient didn't have SEs  Prescribed it  If this doesn't clear up her acne, patient will make appointment to see Dr. Mullins    Patient Instructions     The information in this document, created by the medical scribe for me, accurately reflects the services I personally performed and the decisions made by me. I have reviewed and approved this document for accuracy prior to leaving the patient care area.  May 1, 2018 12:50 PM    Maria Elena Root MD  [facility]

## 2018-05-01 NOTE — NURSING NOTE
"Chief Complaint   Patient presents with     Derm Problem       Initial /64 (BP Location: Right arm, Cuff Size: Adult Regular)  Pulse 80  Temp 98.5  F (36.9  C) (Tympanic)  Wt 148 lb (67.1 kg)  SpO2 99%  BMI 22.5 kg/m2 Estimated body mass index is 22.5 kg/(m^2) as calculated from the following:    Height as of 3/3/17: 5' 8\" (1.727 m).    Weight as of this encounter: 148 lb (67.1 kg).  Medication Reconciliation: complete     Glory Rosas MA    "

## 2018-05-01 NOTE — PATIENT INSTRUCTIONS
Adult Acne    If your skin is erupting with blemishes that you thought could only affect a teenager, you may have adult acne. This is acne in people over the age of 25. Acne in teenagers is more common in teen boys. Acne in adults is more common in women.  Acne is the term for oil-clogged pores. Pores are tiny openings on the skin that become inflamed and form blemishes. Adult acne blemishes show up mainly on the face. In women, blemishes tend to show up around the chin, mouth, jawline, and neck. In men, acne often affects the entire face. But the trunk and upper arms can also be involved.  What causes acne?  Male hormones (androgens) may cause acne in some people. Women with certain conditions such as polycystic ovarian syndrome may make too much male hormones. Acne in women often may happen just before their menstrual periods. If you had acne when you were a teenager or if others in your family have had acne, you may be at more risk for adult acne. The good news is that acne can be treated. Treatments can also decrease the scarring and changes to skin color caused by acne.  Types of acne  Acne happens when certain hair follicles are damaged. One or more of 4 things happen:    The hair follicle is blocked by dead cells and oil (sebum)    The follicle makes more oil (sebum) than normal    Bacteria (P. acnes) grow in the follicle    The follicle becomes irritated (inflamed)  Four types of blemishes can appear:    Whiteheads are round, white blemishes that form when hair follicles become clogged.    Blackheads are round, dark blemishes that form when whiteheads reach the skin s surface and touch air.    Pimples are red, swollen bumps that form when plugged follicle walls break near the skin s surface.    Deep cysts are pus-filled pimples. They form when plugged follicle walls break deep within the skin. Acne cysts are often large and painful. In some cases, they also cause scars.  How treatment can help  The goals  of treatment are to keep new acne blemishes from forming and to prevent scarring and changes in skin color. You will usually need a combination of treatments. You may need to use a treatment for at least 2 months to see if it works. This is because acne lesions take at least 8 weeks to develop.  Your treatment will depend on how serious your acne is. You and your healthcare provider can discuss the best way to treat and control it. In most cases, acne treatment includes:    Good skin care that doesn t damage or irritate skin    Medicines put on the skin (topical)    Antibiotics, hormones, or both  Often you will need to take several medicines at first. For some treatments, women must use a birth control method so they won t get pregnant while being treated.  Your healthcare provider may also remove blemishes or give you injections. If you have acne scars, you may need surgery or medicines to help improve the way your skin looks. Be sure you understand your treatment plan and any side effects it might cause. You will play an important role in the success of your treatment.  Date Last Reviewed: 2/1/2017 2000-2017 [employer]. [address] [place] [address]. All rights reserved. This information is not intended as a substitute for professional medical care. Always follow your healthcare professional's instructions.        Controlling Adult or Adolescent Acne     Look for water-based, oil-free skin care products. These are less likely to clog your pores.   You stand the best chance of controlling your acne if you follow your treatment plan. Be patient. Acne often takes months to improve, not days or weeks. Ask your healthcare provider when you can expect your skin to look better. If you don t see results by your goal date, call your healthcare provider. He or she may want to give you some other type of treatment.  Using skin care products and cosmetics  Besides following your treatment plan,  take care in choosing skin care products and cosmetics. The following tips may help:    Choose gentle, oil-free soaps and facial cleansers.    Avoid harsh acne scrubs, cleansers, or astringents. These types of products can irritate your skin.    Ask your healthcare provider before buying over-the-counter acne treatments. Some of these, such as those with benzoyl peroxide or salicylic acid, can work. But they often have side effects, such as skin getting too dry with treatments that are too strong.    Read labels on makeup and moisturizers. Choose those that are water based and oil free. Look for the term noncomedogenic. It means that the product won t clog your pores.  Caring for your skin  The right skin care routine can help keep your skin healthy. To take good care of your skin, try these tips:    Gently wash your face or other affected skin twice a day with a mild cleanser. Using your fingertips, smooth the cleanser over your skin. Rinse your skin well. Pat it dry.    If your healthcare provider has approved any over-the-counter acne medicine, use as directed. Use it after you wash your skin. Apply the medicine to all areas where you get acne. Don t just treat acne you can see now. New blemishes may be in progress but not in view yet. Treat all the skin.    Don t squeeze or pick blemishes. Acne blemishes may heal on their own. But squeezing can cause scarring. Scars will remain after acne blemishes heal.    Sponges, brushes, or other abrasive tools can irritate the skin. Avoid using them.    If you use soft sponges or cloths to apply your makeup, keep them clean.    Try not to touch your face.    If possible, avoid clothing and equipment that blocks or rubs the face.  Getting good results  Learning more about acne is the first step toward controlling this condition. Know that acne that s being treated often gets worse at first before it improves. But with proper treatment and skin care, you can manage your acne  and feel better about your skin.   Date Last Reviewed: 2/1/2017 2000-2017 [employer]. [address] [place] [address], [place] [address]. All rights reserved. This information is not intended as a substitute for professional medical care. Always follow your healthcare professional's instructions.

## 2019-02-13 ENCOUNTER — HOSPITAL ENCOUNTER (OUTPATIENT)
Dept: MAMMOGRAPHY | Facility: CLINIC | Age: 44
Discharge: HOME | End: 2019-02-13
Attending: INTERNAL MEDICINE | Admitting: INTERNAL MEDICINE
Payer: COMMERCIAL

## 2019-02-13 DIAGNOSIS — Z12.31 VISIT FOR SCREENING MAMMOGRAM: ICD-10-CM

## 2019-02-13 PROCEDURE — 77063 BREAST TOMOSYNTHESIS BI: CPT

## 2019-02-15 ENCOUNTER — OFFICE VISIT (OUTPATIENT)
Dept: FAMILY MEDICINE | Facility: CLINIC | Age: 44
End: 2019-02-15
Payer: COMMERCIAL

## 2019-02-15 VITALS
HEIGHT: 68 IN | HEART RATE: 67 BPM | DIASTOLIC BLOOD PRESSURE: 77 MMHG | OXYGEN SATURATION: 100 % | BODY MASS INDEX: 21.52 KG/M2 | WEIGHT: 142 LBS | SYSTOLIC BLOOD PRESSURE: 112 MMHG

## 2019-02-15 DIAGNOSIS — Z13.29 SCREENING FOR THYROID DISORDER: ICD-10-CM

## 2019-02-15 DIAGNOSIS — Z13.220 SCREENING FOR LIPID DISORDERS: ICD-10-CM

## 2019-02-15 DIAGNOSIS — G25.0 BENIGN ESSENTIAL TREMOR: ICD-10-CM

## 2019-02-15 DIAGNOSIS — H91.93 BILATERAL HEARING LOSS, UNSPECIFIED HEARING LOSS TYPE: ICD-10-CM

## 2019-02-15 DIAGNOSIS — F41.9 ANXIETY: ICD-10-CM

## 2019-02-15 DIAGNOSIS — I10 BENIGN HYPERTENSION: ICD-10-CM

## 2019-02-15 DIAGNOSIS — Z00.00 ROUTINE HISTORY AND PHYSICAL EXAMINATION OF ADULT: Primary | ICD-10-CM

## 2019-02-15 DIAGNOSIS — Z11.4 SCREENING FOR HIV (HUMAN IMMUNODEFICIENCY VIRUS): ICD-10-CM

## 2019-02-15 LAB
ANION GAP SERPL CALCULATED.3IONS-SCNC: 3 MMOL/L (ref 3–14)
BUN SERPL-MCNC: 11 MG/DL (ref 7–30)
CALCIUM SERPL-MCNC: 8.9 MG/DL (ref 8.5–10.1)
CHLORIDE SERPL-SCNC: 107 MMOL/L (ref 94–109)
CHOLEST SERPL-MCNC: 194 MG/DL
CO2 SERPL-SCNC: 27 MMOL/L (ref 20–32)
CREAT SERPL-MCNC: 0.82 MG/DL (ref 0.52–1.04)
GFR SERPL CREATININE-BSD FRML MDRD: 86 ML/MIN/{1.73_M2}
GLUCOSE SERPL-MCNC: 90 MG/DL (ref 70–99)
HDLC SERPL-MCNC: 77 MG/DL
LDLC SERPL CALC-MCNC: 101 MG/DL
NONHDLC SERPL-MCNC: 117 MG/DL
POTASSIUM SERPL-SCNC: 3.9 MMOL/L (ref 3.4–5.3)
SODIUM SERPL-SCNC: 137 MMOL/L (ref 133–144)
TRIGL SERPL-MCNC: 82 MG/DL
TSH SERPL DL<=0.005 MIU/L-ACNC: 3.86 MU/L (ref 0.4–4)

## 2019-02-15 PROCEDURE — 87389 HIV-1 AG W/HIV-1&-2 AB AG IA: CPT | Performed by: INTERNAL MEDICINE

## 2019-02-15 PROCEDURE — 99396 PREV VISIT EST AGE 40-64: CPT | Performed by: INTERNAL MEDICINE

## 2019-02-15 PROCEDURE — 84443 ASSAY THYROID STIM HORMONE: CPT | Performed by: INTERNAL MEDICINE

## 2019-02-15 PROCEDURE — 80061 LIPID PANEL: CPT | Performed by: INTERNAL MEDICINE

## 2019-02-15 PROCEDURE — 80048 BASIC METABOLIC PNL TOTAL CA: CPT | Performed by: INTERNAL MEDICINE

## 2019-02-15 PROCEDURE — 36415 COLL VENOUS BLD VENIPUNCTURE: CPT | Performed by: INTERNAL MEDICINE

## 2019-02-15 RX ORDER — LORAZEPAM 0.5 MG/1
0.5 TABLET ORAL PRN
Qty: 60 TABLET | Refills: 0 | Status: SHIPPED | OUTPATIENT
Start: 2019-02-15 | End: 2020-03-02

## 2019-02-15 RX ORDER — METOPROLOL SUCCINATE 50 MG/1
50 TABLET, EXTENDED RELEASE ORAL DAILY
Qty: 90 TABLET | Refills: 3 | Status: SHIPPED | OUTPATIENT
Start: 2019-02-15 | End: 2020-03-02

## 2019-02-15 ASSESSMENT — MIFFLIN-ST. JEOR: SCORE: 1332.61

## 2019-02-15 NOTE — PATIENT INSTRUCTIONS
Preventive Health Recommendations  Female Ages 40 to 49    Yearly exam:     See your health care provider every year in order to  1. Review health changes.   2. Discuss preventive care.    3. Review your medicines if your doctor prescribed any.      Get a Pap test every three years (unless you have an abnormal result and your provider advises testing more often).      If you get Pap tests with HPV test, you only need to test every 5 years, unless you have an abnormal result. You do not need a Pap test if your uterus was removed (hysterectomy) and you have not had cancer.      You should be tested each year for STDs (sexually transmitted diseases), if you're at risk.     Ask your doctor if you should have a mammogram.      Have a colonoscopy (test for colon cancer) if someone in your family has had colon cancer or polyps before age 50.       Have a cholesterol test every 5 years.       Have a diabetes test (fasting glucose) after age 45. If you are at risk for diabetes, you should have this test every 3 years.    Shots: Get a flu shot each year. Get a tetanus shot every 10 years.     Nutrition:     Eat at least 5 servings of fruits and vegetables each day.    Eat whole-grain bread, whole-wheat pasta and brown rice instead of white grains and rice.    Get adequate Calcium and Vitamin D.      Lifestyle    Exercise at least 150 minutes a week (an average of 30 minutes a day, 5 days a week). This will help you control your weight and prevent disease.    Limit alcohol to one drink per day.    No smoking.     Wear sunscreen to prevent skin cancer.    See your dentist every six months for an exam and cleaning.    Labs today  Lorazepam can be habit-forming. It should be taken as prescribed. Do not mix it  with alcohol. Be careful with driving.Do not loose the  Prescription.  Do not overuse this medication. It is a controlled substance.    Follow up in one year for physical   Seek sooner medical attention if there is any  worsening of symptoms or problems.

## 2019-02-15 NOTE — PROGRESS NOTES
SUBJECTIVE:   CC: Domonique Masters is an 44 year old woman who presents for preventive health visit.     Physical   Annual:     Getting at least 3 servings of Calcium per day:  Yes    Bi-annual eye exam:  Yes    Dental care twice a year:  Yes    Sleep apnea or symptoms of sleep apnea:  None    Diet:  Regular (no restrictions)    Frequency of exercise:  4-5 days/week    Duration of exercise:  30-45 minutes    Taking medications regularly:  Yes    Medication side effects:  None    Additional concerns today:  Yes    PHQ-2 Total Score: 0      Today's PHQ-2 Score:   PHQ-2 ( 1999 Pfizer) 2/15/2019   Q1: Little interest or pleasure in doing things 0   Q2: Feeling down, depressed or hopeless 0   PHQ-2 Score 0   Q1: Little interest or pleasure in doing things -   Q2: Feeling down, depressed or hopeless -   PHQ-2 Score -       Abuse: Current or Past(Physical, Sexual or Emotional)-   Do you feel safe in your environment? No    Social History     Tobacco Use     Smoking status: Never Smoker     Smokeless tobacco: Never Used   Substance Use Topics     Alcohol use: No     Alcohol/week: 0.0 oz     Comment: recovering alcoholic     Alcohol Use 2/9/2019   If you drink alcohol do you typically have greater than 3 drinks per day OR greater than 7 drinks per week? Not Applicable       Reviewed orders with patient.  Reviewed health maintenance and updated orders accordingly - Yes  Medication and Labs reviewed in EPIC  Patient Active Problem List   Diagnosis     Stricture and stenosis of esophagus     Benign hypertension     Anxiety     Elevated liver enzymes     Abnormal TSH     Benign essential tremor     CARDIOVASCULAR SCREENING; LDL GOAL LESS THAN 130     GERD (gastroesophageal reflux disease)     IUD (intrauterine device) in place     History of basal cell carcinoma     Family history of skin cancer     Past Surgical History:   Procedure Laterality Date     C APPENDECTOMY  1983     ESOPHAGOSCOPY, GASTROSCOPY, DUODENOSCOPY (EGD),  COMBINED N/A 9/9/2014    Procedure: COMBINED ESOPHAGOSCOPY, GASTROSCOPY, DUODENOSCOPY (EGD), BIOPSY SINGLE OR MULTIPLE;  Surgeon: Gilberto Soto MD;  Location:  GI     GYN SURGERY      left ovarian cyst removed     UPPER GI ENDOSCOPY PERFORMED      x2       Social History     Tobacco Use     Smoking status: Never Smoker     Smokeless tobacco: Never Used   Substance Use Topics     Alcohol use: No     Alcohol/week: 0.0 oz     Comment: recovering alcoholic     Family History   Problem Relation Age of Onset     Genitourinary Problems Father         ESRD on dialysis     Cardiovascular Father      Cardiovascular Paternal Grandfather      Breast Cancer No family hx of      Cancer - colorectal No family hx of      Colon Cancer No family hx of          Current Outpatient Medications   Medication Sig Dispense Refill     levonorgestrel (MIRENA) 20 MCG/24HR IUD 1 each by Intrauterine route once       LORazepam (ATIVAN) 0.5 MG tablet Take 1 tablet (0.5 mg) by mouth as needed 60 tablet 0     LYSINE 1 qod       metoprolol succinate ER (TOPROL-XL) 50 MG 24 hr tablet Take 1 tablet (50 mg) by mouth daily 90 tablet 3     MULTIVITAMIN OR 1 qd       Allergies   Allergen Reactions     Retin-A [Tretinoin] Rash       Pertinent mammograms are reviewed under the imaging tab.  History of abnormal Pap smear: NO - age 30-65 PAP every 5 years with negative HPV co-testing recommended  PAP / HPV Latest Ref Rng & Units 2/2/2018 12/2/2015 11/22/2005   PAP - NIL NIL NIL   HPV 16 DNA NEG:Negative Negative - -   HPV 18 DNA NEG:Negative Negative - -   OTHER HR HPV NEG:Negative Negative - -     Reviewed and updated as needed this visit by clinical staff  Tobacco  Meds         Reviewed and updated as needed this visit by Provider         Review of Systems  CONSTITUTIONAL: NEGATIVE for fever, chills, change in weight  INTEGUMENTARU/SKIN: NEGATIVE for worrisome rashes, moles or lesions  EYES: NEGATIVE for vision changes or irritation  ENT: NEGATIVE for  "ear, mouth and throat problems. POSITIVE for difficulty hearing.  RESP: NEGATIVE for significant cough or SOB  BREAST: NEGATIVE for masses, tenderness or discharge  CV: NEGATIVE for chest pain, palpitations or peripheral edema  GI: NEGATIVE for nausea, abdominal pain, heartburn, or change in bowel habits  : NEGATIVE for unusual urinary or vaginal symptoms. Periods are regular.  MUSCULOSKELETAL: NEGATIVE for significant arthralgias or myalgia  NEURO: NEGATIVE for weakness, dizziness or paresthesias  PSYCHIATRIC: NEGATIVE for changes in mood or affect    This document serves as a record of the services and decisions personally performed and made by Maria Elena Root MD. It was created on her behalf by Carmelita Chu, a trained medical scribe. The creation of this document is based on the provider's statements to the medical scribe.  Carmelita Chu 12:36 PM 02/15/2019     OBJECTIVE:   /77   Pulse 67   Ht 1.727 m (5' 8\")   Wt 64.4 kg (142 lb)   SpO2 100%   Breastfeeding? No   BMI 21.59 kg/m       Physical Exam  GENERAL: healthy, alert and no distress  EYES: Eyes grossly normal to inspection, PERRL and conjunctivae and sclerae normal  HENT: ear canals and TM's normal, no cerumen impact bilaterally, nose and mouth without ulcers or lesions  NECK: no adenopathy, no asymmetry, masses, or scars and thyroid normal to palpation  RESP: lungs clear to auscultation - no rales, rhonchi or wheezes  BREAST: normal without masses, tenderness or nipple discharge and no palpable axillary masses or adenopathy  CV: regular rate and rhythm, normal S1 S2, no S3 or S4, no murmur, click or rub, no peripheral edema and peripheral pulses strong  ABDOMEN: soft, nontender, no hepatosplenomegaly, no masses and bowel sounds normal   (female): not preformed. PAP smear completed 02/02/18.  MS: no gross musculoskeletal defects noted, no edema  SKIN: no suspicious lesions or rashes, numerous moles and freckles throughout the " "body  NEURO: Normal strength and tone, mentation intact and speech normal  PSYCH: mentation appears normal, affect normal/bright        ASSESSMENT/PLAN:     Domonique was seen today for physical.    Diagnoses and all orders for this visit:    Routine history and physical examination of adult  Doing well.  IUD in place.  Prescriptions refilled.  Immunizations up to date.  Mammogram up to date. Completed 02/13/19. Results normal.  PAP smear up to date and normal. Follow up every 3 years.  Lab work ordered.  Concerned about difficulty hearing. Does not know if symptoms persist bilaterally.  Provided information to schedule with ENT.  Scheduled with Joanna Mullins MD ([facility]) for skin care management.  Follow- up in one year for physical    Screening for thyroid disorder  -     TSH with free T4 reflex    Screening for lipid disorders  -     Lipid panel reflex to direct LDL Fasting    Screening for HIV (human immunodeficiency virus)  -     HIV Antigen Antibody Combo    Anxiety  -     LORazepam (ATIVAN) 0.5 MG tablet; Take 1 tablet (0.5 mg) by mouth as needed         Using Ativan prn, usually once monthly.    Benign hypertension  -     metoprolol succinate ER (TOPROL-XL) 50 MG 24 hr tablet; Take 1 tablet (50 mg) by mouth daily  -     Basic metabolic panel  Compliant with metoprolol as prescribed.    Benign essential tremor  -     metoprolol succinate ER (TOPROL-XL) 50 MG 24 hr tablet; Take 1 tablet (50 mg) by mouth daily        Notes Tremors have improved over the last 5-6 years.        COUNSELING:  Reviewed preventive health counseling, as reflected in patient instructions       Regular exercise       Healthy diet/nutrition       Hearing screening    BP Readings from Last 1 Encounters:   02/15/19 112/77     Estimated body mass index is 21.59 kg/m  as calculated from the following:    Height as of this encounter: 1.727 m (5' 8\").    Weight as of this encounter: 64.4 kg (142 lb).           reports that  has never " smoked. she has never used smokeless tobacco.      Counseling Resources:  ATP IV Guidelines  Pooled Cohorts Equation Calculator  Breast Cancer Risk Calculator  FRAX Risk Assessment  ICSI Preventive Guidelines  Dietary Guidelines for Americans, 2010  USDA's MyPlate  ASA Prophylaxis  Lung CA Screening    The information in this document, created by the medical scribe, Carmelita Chu, for me, accurately reflects the services I personally performed and the decisions made by me. I have reviewed and approved this document for accuracy prior to leaving the patient care area.    Maria Elena Root MD  [facility]

## 2019-02-15 NOTE — PROGRESS NOTES
"   SUBJECTIVE:   CC: Domonique Masters is an 44 year old woman who presents for preventive health visit.     Healthy Habits:    Do you get at least three servings of calcium containing foods daily (dairy, green leafy vegetables, etc.)? { :106486::\"yes\"}    Amount of exercise or daily activities, outside of work: { :235550}    Problems taking medications regularly { :242634::\"No\"}    Medication side effects: { :564776::\"No\"}    Have you had an eye exam in the past two years? { :540811}    Do you see a dentist twice per year? { :996243}    Do you have sleep apnea, excessive snoring or daytime drowsiness?{ :961626}  {Outside tests to abstract? :545031}    {additional problems to add (Optional):974306}    Today's PHQ-2 Score:   PHQ-2 ( 1999 Pfizer) 2/9/2019 2/2/2018   Q1: Little interest or pleasure in doing things 0 0   Q2: Feeling down, depressed or hopeless 0 0   PHQ-2 Score 0 0   Q1: Little interest or pleasure in doing things Not at all -   Q2: Feeling down, depressed or hopeless Not at all -   PHQ-2 Score 0 -     {PHQ-2 LOOK IN ASSESSMENTS (Optional) :348351}  Abuse: Current or Past(Physical, Sexual or Emotional)- {YES/NO/NA:810110}  Do you feel safe in your environment? {YES/NO/NA:814300}    Social History     Tobacco Use     Smoking status: Never Smoker     Smokeless tobacco: Never Used   Substance Use Topics     Alcohol use: No     Alcohol/week: 0.0 oz     Comment: recovering alcoholic     If you drink alcohol do you typically have >3 drinks per day or >7 drinks per week? {ETOH :421199}                     Reviewed orders with patient.  Reviewed health maintenance and updated orders accordingly - {Yes/No:525846::\"Yes\"}  {Chronicprobdata (Optional):989407}    {Mammo Decision Support (Optional):072176}    Pertinent mammograms are reviewed under the imaging tab.  History of abnormal Pap smear: {PAP HX:609716}  PAP / HPV Latest Ref Rng & Units 2/2/2018 12/2/2015 11/22/2005   PAP - NIL NIL NIL   HPV 16 DNA NEG:Negative " "Negative - -   HPV 18 DNA NEG:Negative Negative - -   OTHER HR HPV NEG:Negative Negative - -     Reviewed and updated as needed this visit by clinical staff         Reviewed and updated as needed this visit by Provider        {HISTORY OPTIONS (Optional):521914}    ROS:  { :594577}    OBJECTIVE:   There were no vitals taken for this visit.  EXAM:  {Exam Choices:821189}    {Diagnostic Test Results (Optional):525713::\"Diagnostic Test Results:\",\"none \"}    ASSESSMENT/PLAN:   {Diag Picklist:376227}    COUNSELING:   {FEMALE COUNSELING MESSAGES:652081::\"Reviewed preventive health counseling, as reflected in patient instructions\"}    BP Readings from Last 1 Encounters:   05/01/18 109/64     Estimated body mass index is 22.5 kg/m  as calculated from the following:    Height as of 3/3/17: 1.727 m (5' 8\").    Weight as of 5/1/18: 67.1 kg (148 lb).    {BP Counseling- Complete if BP >= 120/80  (Optional):204967}  {Weight Management Plan (ACO) Complete if BMI is abnormal-  Ages 18-64  BMI >24.9.  Age 65+ with BMI <23 or >30 (Optional):925133}     reports that  has never smoked. she has never used smokeless tobacco.  {Tobacco Cessation -- Complete if patient is a smoker (Optional):933608}    Counseling Resources:  ATP IV Guidelines  Pooled Cohorts Equation Calculator  Breast Cancer Risk Calculator  FRAX Risk Assessment  ICSI Preventive Guidelines  Dietary Guidelines for Americans, 2010  USDA's MyPlate  ASA Prophylaxis  Lung CA Screening    Maria Elena Root MD  [facility]  "

## 2019-02-16 LAB — HIV 1+2 AB+HIV1 P24 AG SERPL QL IA: NONREACTIVE

## 2019-02-17 NOTE — RESULT ENCOUNTER NOTE
Diann Youssef,    This is to inform you regarding your test result.    HIV 1&2 Antibody is negative.  Your total cholesterol is normal.  HDL which is called good cholesterol is normal.  Your LDL which is called bad cholesterol is elevated.  Your triglycerides are normal.  Eat low cholesterol low fat  diet and do regular physical activity.  TSH which is thyroid hormone is normal.  Basic metabolic panel which includes electrolytes and kidney fucntion is normal        Sincerely,      Dr.Nasima Ivett MD,FACP

## 2019-02-18 ENCOUNTER — OFFICE VISIT (OUTPATIENT)
Dept: FAMILY MEDICINE | Facility: CLINIC | Age: 44
End: 2019-02-18
Payer: COMMERCIAL

## 2019-02-18 VITALS — HEART RATE: 87 BPM | SYSTOLIC BLOOD PRESSURE: 110 MMHG | OXYGEN SATURATION: 100 % | DIASTOLIC BLOOD PRESSURE: 66 MMHG

## 2019-02-18 DIAGNOSIS — L81.4 SOLAR LENTIGO: ICD-10-CM

## 2019-02-18 DIAGNOSIS — Z85.828 HISTORY OF BASAL CELL CARCINOMA: ICD-10-CM

## 2019-02-18 DIAGNOSIS — Z12.83 SKIN CANCER SCREENING: Primary | ICD-10-CM

## 2019-02-18 DIAGNOSIS — D18.01 CAPILLARY HEMANGIOMA OF SKIN: ICD-10-CM

## 2019-02-18 DIAGNOSIS — D22.9 MULTIPLE BENIGN MELANOCYTIC NEVI: ICD-10-CM

## 2019-02-18 PROCEDURE — 99213 OFFICE O/P EST LOW 20 MIN: CPT | Performed by: FAMILY MEDICINE

## 2019-02-18 NOTE — PROGRESS NOTES
[facility] - PRIMARY CARE SKIN    CC: skin cancer screening (full-body)  SUBJECTIVE:   Domonique Masters is a(n) 44 year old female who presents to clinic today for a full-body skin exam.    Bothersome lesions noticed by the patient or other skin concerns:  Issue One: A bright pink lesion on the left calf appears similar to a previous basal cell carcinoma. It has faded in color at times.  Enlarging: NO.  Bleeding: NO.  Itchy or irritating: NO.  Pain or tenderness: NO.  Changing color: NO.    Personal Medical History  Skin cancer: YES - basal cell carcinoma on right thigh, basal cell carcinoma on chest    Family Medical History  Skin cancer: YES - squamous cell carcinoma in grandmother    Sun Exposure History  Previous history of significant sun exposure: grew up in [facility]  Blistering sunburns: YES - 10 times.  Tanning beds: YES - when younger.  Sunscreen usage: YES, SPF: 15.  UV-protective clothing usage: NO.  Wide-brimmed hat usage: NO.  UV-protective sunglasses usage: YES.  Mid-day sun avoidance: NO.    Occupation: [employer] (both indoor & outdoor).    Refer to electronic medical record (EMR) for past medical history and medications.    INTEGUMENTARY/SKIN: POSITIVE for changing lesion  ROS: 14 point review of systems was negative except the symptoms listed above in the HPI.    This document serves as a record of the services and decisions personally performed and made by Joanna Mullins MD and was created by Nii Jacobs, a trained medical scribe, based on personal observations and provider statements to the medical scribe.  February 18, 2019 1:32 PM   Nii Jacobs    OBJECTIVE:   GENERAL: healthy, alert and no distress.  SKIN: Berger Skin Type - I.  This patient was examined from the top of the head to the bottom of the feet  including scalp, face, neck, trunk, buttocks, both arms, both legs, both hands, both feet, and all fingers and toes. The dermatoscope was used to help evaluate pigmented  "lesions.  Skin Pertinent Findings:  Upper extremities: Multiple brown macules of various sizes and shapes most consistent with (benign) melanocytic nevi. Multiple brown, macule(s) most consistent with benign solar lentigo.    Anterior trunk: multiple brown macules of various sizes and shapes most consistent with (benign) melanocytic nevi on the abdomen. slightly raised, red lesion(s) consistent with capillary hemangioma    Left third toe: 4 mm in size brown macule(s) most consistent with benign nevus(i).    Left base of fourth toe: 4 mm in size brown macule(s) most consistent with benign nevus(i).    Back: Multiple brown macules of various sizes and shapes most consistent with (benign) melanocytic nevi. brown, macule(s) most consistent with benign solar lentigo.    Significant Findings:  Mid-chest: well-healed scar  Right anterior thigh: well-healed scar    Left posteromedial mid-calf: 4 mm in size brown macule(s) most consistent with benign nevus(i).    ASSESSMENT:     Encounter Diagnoses   Name Primary?     Skin cancer screening Yes     History of basal cell carcinoma      Multiple benign melanocytic nevi      Solar lentigo      Capillary hemangioma of skin          PLAN:   Patient Instructions   FUTURE APPOINTMENTS  Follow up in 1 year(s) for a full-body skin cancer screening.    SUN PROTECTION INSTRUCTIONS  Sun damage can lead to skin cancer and premature aging of the skin.      The best way to protect from sun damage to your skin is to avoid the sun during peak hours (10 am - 2 pm) even on overcast days.    Never use tanning beds. Tanning beds are associated with much higher risks of skin cancer.    All tanning damages the skin. Aim for ivory skin year round and you will have less trouble with your skin in years to come. There is no merit in getting \"a base tan\" before a warm weather vacation, as any tanning indicates your body's response to sun damage.    Stop smoking. Smokers have higher rates of skin cancer " "and also have premature skin wrinkling.    Use UPF sun-protective clothing, which while more expensive initially provides longer lasting coverage without having to worry about remembering to re-apply.  1. Wear a wide-brimmed hat and sunglasses.   2. Wear sun-protective clothing.  [employer] and other imagine make sun protective clothing that are stylish, comfortable and cool.   [employer] and other imagine make UV arm sleeves suitable for golfing, gardening and other activities.    Sunscreen instructions:  1. Use sunscreens with Zinc Oxide, Titanium Dioxide or Avobenzone to protect from UVA rays.  2. Use SPF 30-50+ to protect from UVB rays.  3. Re-apply every 2 hours even if water resistant.  4. Apply on your face every day even when cloudy and even in the winter. UVA \"aging rays\" penetrate window glass and are just as strong in the winter as in the summer.    FYI  You should use about 3 tablespoons of sunscreen to protect your whole body. Thus a typical eight ounce bottle of sunscreen should last 4 applications. Remember, that the SPF rating is compromised if you don t apply enough. Most people only apply 1/2 - 1/3 of the amount they need. Also don t forget areas such as your ears, feet, upper back and harder to reach places. Keep in mind that these amounts should be increased for larger body sizes.    Sunscreens with titanium dioxide and/or zinc oxide in the active ingredients are physical blockers as opposed to chemical blockers. Chemical-free sunscreens should not irritate the skin.    Spray-on sunscreens may be used for touch-up application only, not as a base layer. Also, use with caution around small children due to inhalation risk.    SPF means sun protection factor, which is just the degree to which the sunscreen can protect against UVB rays. There is no rating system for UVA rays. SPF is calculated as the time skin will burn when sunscreen is applied vs. skin without sunscreen.    Water " resistant sunscreens should be re-applied every 1-2 hours.    Product Recommendations:    Consider use of sunscreen sticks with Zinc Oxide and Titanium Dioxide active ingredients such as Neutrogena Pure&Free Baby Sunscreen Stick.    Good examples include: Blue Lizard, EltaMD, Solbar    Good daily moisturizers with SPF: Vanicream, CeraVe.    For sensitive skin, consider : SkinMedica Essential Defense Mineral Shield Broad Spectrum SPF 35    Men: consider use of Neutrogena Triple Protect Facial Lotion    Avoid retinyl palmitate products.  Avoid combination products that include both sunscreen and insect repellant, as sunscreen should be applied every 2 hours, but insect repellant should not be applied as frequently.    For more information:  https://www.skincancer.org/prevention/sun-protection/sunscreen/sunscreens-safe-and-effective    SKIN CANCER SELF-EXAM INSTRUCTIONS  Check every month in the mirror or with a household member. Be aware of any changes, especially bleeding or tenderness. Also, make sure to check your nails for color changes after removal of nail polish.    For melanoma, check for:  A - Asymmetry. One half unlike the other half.  B - Border. Irregular, scalloped, ragged, notched, blurred or poorly defined borders.  C - Color. Color variations from one area to another, with shades of tan, brown and/or black present. Sometimes white, red or blue.  D - Diameter. Greater than 6 mm (about the size of a pencil eraser). Any new growth of a mole should be concerning and be evaluated.  E - Evolving. A mole or skin lesion that looks different from the rest or is changing in size, shape or color.    For basal cell carcinoma and squamous cell carcinoma, check for:    Sores, shiny bumps, nodules, scaly lesions, or wart-like growths that are itchy, tender, crusting, scabbing, eroding, oozing or bleeding.    Open sores/wounds or reddish/irritated areas that do not heal within 2-3 weeks.    Scar-like areas that are  white, yellow or waxy in color.      The patient was counseled about sunscreens and sun avoidance. The patient was counseled to check the skin regularly and report any lesion that is new, changing, itching, scabbing, bleeding or otherwise bothersome. The patient was discharged ambulatory and in stable condition.    TT: 25 minutes.  CT: 15 minutes.    The information in this document, created by the medical scribe for me, accurately reflects the services I personally performed and the decisions made by me. I have reviewed and approved this document for accuracy prior to leaving the patient care area.  February 18, 2019 1:32 PM  Joanna Mullins MD  [facility] [place]

## 2019-02-18 NOTE — LETTER
2/18/2019         RE: Domonique Masters  [address] [place] [address]        Dear Colleague,    Thank you for referring your patient, Domonique Masters, to the [facility] [place]. Please see a copy of my visit note below.    [facility] - PRIMARY CARE SKIN    CC: skin cancer screening (full-body)  SUBJECTIVE:   Domonique Masters is a(n) 44 year old female who presents to clinic today for a full-body skin exam.    Bothersome lesions noticed by the patient or other skin concerns:  Issue One: A bright pink lesion on the left calf appears similar to a previous basal cell carcinoma. It has faded in color at times.  Enlarging: NO.  Bleeding: NO.  Itchy or irritating: NO.  Pain or tenderness: NO.  Changing color: NO.    Personal Medical History  Skin cancer: YES - basal cell carcinoma on right thigh, basal cell carcinoma on chest    Family Medical History  Skin cancer: YES - squamous cell carcinoma in grandmother    Sun Exposure History  Previous history of significant sun exposure: grew up in [facility]  Blistering sunburns: YES - 10 times.  Tanning beds: YES - when younger.  Sunscreen usage: YES, SPF: 15.  UV-protective clothing usage: NO.  Wide-brimmed hat usage: NO.  UV-protective sunglasses usage: YES.  Mid-day sun avoidance: NO.    Occupation: [employer] (both indoor & outdoor).    Refer to electronic medical record (EMR) for past medical history and medications.    INTEGUMENTARY/SKIN: POSITIVE for changing lesion  ROS: 14 point review of systems was negative except the symptoms listed above in the HPI.    This document serves as a record of the services and decisions personally performed and made by Joanna Mullins MD and was created by Nii Jacobs, a trained medical scribe, based on personal observations and provider statements to the medical scribe.  February 18, 2019 1:32 PM   Nii Jacobs    OBJECTIVE:   GENERAL: healthy, alert and no distress.  SKIN: Berger Skin Type - I.  This patient  was examined from the top of the head to the bottom of the feet  including scalp, face, neck, trunk, buttocks, both arms, both legs, both hands, both feet, and all fingers and toes. The dermatoscope was used to help evaluate pigmented lesions.  Skin Pertinent Findings:  Upper extremities: Multiple brown macules of various sizes and shapes most consistent with (benign) melanocytic nevi. Multiple brown, macule(s) most consistent with benign solar lentigo.    Anterior trunk: multiple brown macules of various sizes and shapes most consistent with (benign) melanocytic nevi on the abdomen. slightly raised, red lesion(s) consistent with capillary hemangioma    Left third toe: 4 mm in size brown macule(s) most consistent with benign nevus(i).    Left base of fourth toe: 4 mm in size brown macule(s) most consistent with benign nevus(i).    Back: Multiple brown macules of various sizes and shapes most consistent with (benign) melanocytic nevi. brown, macule(s) most consistent with benign solar lentigo.    Significant Findings:  Mid-chest: well-healed scar  Right anterior thigh: well-healed scar    Left posteromedial mid-calf: 4 mm in size brown macule(s) most consistent with benign nevus(i).    ASSESSMENT:     Encounter Diagnoses   Name Primary?     Skin cancer screening Yes     History of basal cell carcinoma      Multiple benign melanocytic nevi      Solar lentigo      Capillary hemangioma of skin          PLAN:   Patient Instructions   FUTURE APPOINTMENTS  Follow up in 1 year(s) for a full-body skin cancer screening.    SUN PROTECTION INSTRUCTIONS  Sun damage can lead to skin cancer and premature aging of the skin.      The best way to protect from sun damage to your skin is to avoid the sun during peak hours (10 am - 2 pm) even on overcast days.    Never use tanning beds. Tanning beds are associated with much higher risks of skin cancer.    All tanning damages the skin. Aim for ivory skin year round and you will have less  "trouble with your skin in years to come. There is no merit in getting \"a base tan\" before a warm weather vacation, as any tanning indicates your body's response to sun damage.    Stop smoking. Smokers have higher rates of skin cancer and also have premature skin wrinkling.    Use UPF sun-protective clothing, which while more expensive initially provides longer lasting coverage without having to worry about remembering to re-apply.  1. Wear a wide-brimmed hat and sunglasses.   2. Wear sun-protective clothing.  [employer] and other [employer] make sun protective clothing that are stylish, comfortable and cool.   [employer] and other [employer] make UV arm sleeves suitable for golfing, gardening and other activities.    Sunscreen instructions:  1. Use sunscreens with Zinc Oxide, Titanium Dioxide or Avobenzone to protect from UVA rays.  2. Use SPF 30-50+ to protect from UVB rays.  3. Re-apply every 2 hours even if water resistant.  4. Apply on your face every day even when cloudy and even in the winter. UVA \"aging rays\" penetrate window glass and are just as strong in the winter as in the summer.    FYI  You should use about 3 tablespoons of sunscreen to protect your whole body. Thus a typical eight ounce bottle of sunscreen should last 4 applications. Remember, that the SPF rating is compromised if you don t apply enough. Most people only apply 1/2 - 1/3 of the amount they need. Also don t forget areas such as your ears, feet, upper back and harder to reach places. Keep in mind that these amounts should be increased for larger body sizes.    Sunscreens with titanium dioxide and/or zinc oxide in the active ingredients are physical blockers as opposed to chemical blockers. Chemical-free sunscreens should not irritate the skin.    Spray-on sunscreens may be used for touch-up application only, not as a base layer. Also, use with caution around small children due to inhalation risk.    SPF means sun protection " factor, which is just the degree to which the sunscreen can protect against UVB rays. There is no rating system for UVA rays. SPF is calculated as the time skin will burn when sunscreen is applied vs. skin without sunscreen.    Water resistant sunscreens should be re-applied every 1-2 hours.    Product Recommendations:    Consider use of sunscreen sticks with Zinc Oxide and Titanium Dioxide active ingredients such as Neutrogena Pure&Free Baby Sunscreen Stick.    Good examples include: Blue Lizard, EltaMD, Solbar    Good daily moisturizers with SPF: Vanicream, CeraVe.    For sensitive skin, consider : SkinMedica Essential Defense Mineral Shield Broad Spectrum SPF 35    Men: consider use of Neutrogena Triple Protect Facial Lotion    Avoid retinyl palmitate products.  Avoid combination products that include both sunscreen and insect repellant, as sunscreen should be applied every 2 hours, but insect repellant should not be applied as frequently.    For more information:  https://www.skincancer.org/prevention/sun-protection/sunscreen/sunscreens-safe-and-effective    SKIN CANCER SELF-EXAM INSTRUCTIONS  Check every month in the mirror or with a household member. Be aware of any changes, especially bleeding or tenderness. Also, make sure to check your nails for color changes after removal of nail polish.    For melanoma, check for:  A - Asymmetry. One half unlike the other half.  B - Border. Irregular, scalloped, ragged, notched, blurred or poorly defined borders.  C - Color. Color variations from one area to another, with shades of tan, brown and/or black present. Sometimes white, red or blue.  D - Diameter. Greater than 6 mm (about the size of a pencil eraser). Any new growth of a mole should be concerning and be evaluated.  E - Evolving. A mole or skin lesion that looks different from the rest or is changing in size, shape or color.    For basal cell carcinoma and squamous cell carcinoma, check for:    Sores, shiny  bumps, nodules, scaly lesions, or wart-like growths that are itchy, tender, crusting, scabbing, eroding, oozing or bleeding.    Open sores/wounds or reddish/irritated areas that do not heal within 2-3 weeks.    Scar-like areas that are white, yellow or waxy in color.      The patient was counseled about sunscreens and sun avoidance. The patient was counseled to check the skin regularly and report any lesion that is new, changing, itching, scabbing, bleeding or otherwise bothersome. The patient was discharged ambulatory and in stable condition.    TT: 25 minutes.  CT: 15 minutes.    The information in this document, created by the medical scribe for me, accurately reflects the services I personally performed and the decisions made by me. I have reviewed and approved this document for accuracy prior to leaving the patient care area.  February 18, 2019 1:32 PM  Joanna Mullins MD  [facility]    Again, thank you for allowing me to participate in the care of your patient.        Sincerely,        Joanna Mullins MD

## 2019-02-18 NOTE — PATIENT INSTRUCTIONS
"FUTURE APPOINTMENTS  Follow up in 1 year(s) for a full-body skin cancer screening.    SUN PROTECTION INSTRUCTIONS  Sun damage can lead to skin cancer and premature aging of the skin.      The best way to protect from sun damage to your skin is to avoid the sun during peak hours (10 am - 2 pm) even on overcast days.    Never use tanning beds. Tanning beds are associated with much higher risks of skin cancer.    All tanning damages the skin. Aim for ivory skin year round and you will have less trouble with your skin in years to come. There is no merit in getting \"a base tan\" before a warm weather vacation, as any tanning indicates your body's response to sun damage.    Stop smoking. Smokers have higher rates of skin cancer and also have premature skin wrinkling.    Use UPF sun-protective clothing, which while more expensive initially provides longer lasting coverage without having to worry about remembering to re-apply.  1. Wear a wide-brimmed hat and sunglasses.   2. Wear sun-protective clothing.  [employer] and other [employer] make sun protective clothing that are stylish, comfortable and cool.   [employer] and other [employer] make UV arm sleeves suitable for golfing, gardening and other activities.    Sunscreen instructions:  1. Use sunscreens with Zinc Oxide, Titanium Dioxide or Avobenzone to protect from UVA rays.  2. Use SPF 30-50+ to protect from UVB rays.  3. Re-apply every 2 hours even if water resistant.  4. Apply on your face every day even when cloudy and even in the winter. UVA \"aging rays\" penetrate window glass and are just as strong in the winter as in the summer.    FYI  You should use about 3 tablespoons of sunscreen to protect your whole body. Thus a typical eight ounce bottle of sunscreen should last 4 applications. Remember, that the SPF rating is compromised if you don t apply enough. Most people only apply 1/2 - 1/3 of the amount they need. Also don t forget areas such as your ears, " feet, upper back and harder to reach places. Keep in mind that these amounts should be increased for larger body sizes.    Sunscreens with titanium dioxide and/or zinc oxide in the active ingredients are physical blockers as opposed to chemical blockers. Chemical-free sunscreens should not irritate the skin.    Spray-on sunscreens may be used for touch-up application only, not as a base layer. Also, use with caution around small children due to inhalation risk.    SPF means sun protection factor, which is just the degree to which the sunscreen can protect against UVB rays. There is no rating system for UVA rays. SPF is calculated as the time skin will burn when sunscreen is applied vs. skin without sunscreen.    Water resistant sunscreens should be re-applied every 1-2 hours.    Product Recommendations:    Consider use of sunscreen sticks with Zinc Oxide and Titanium Dioxide active ingredients such as Neutrogena Pure&Free Baby Sunscreen Stick.    Good examples include: Blue Lizard, EltaMD, Solbar    Good daily moisturizers with SPF: Vanicream, CeraVe.    For sensitive skin, consider : SkinMedica Essential Defense Mineral Shield Broad Spectrum SPF 35    Men: consider use of Neutrogena Triple Protect Facial Lotion    Avoid retinyl palmitate products.  Avoid combination products that include both sunscreen and insect repellant, as sunscreen should be applied every 2 hours, but insect repellant should not be applied as frequently.    For more information:  https://www.skincancer.org/prevention/sun-protection/sunscreen/sunscreens-safe-and-effective    SKIN CANCER SELF-EXAM INSTRUCTIONS  Check every month in the mirror or with a household member. Be aware of any changes, especially bleeding or tenderness. Also, make sure to check your nails for color changes after removal of nail polish.    For melanoma, check for:  A - Asymmetry. One half unlike the other half.  B - Border. Irregular, scalloped, ragged, notched, blurred  or poorly defined borders.  C - Color. Color variations from one area to another, with shades of tan, brown and/or black present. Sometimes white, red or blue.  D - Diameter. Greater than 6 mm (about the size of a pencil eraser). Any new growth of a mole should be concerning and be evaluated.  E - Evolving. A mole or skin lesion that looks different from the rest or is changing in size, shape or color.    For basal cell carcinoma and squamous cell carcinoma, check for:    Sores, shiny bumps, nodules, scaly lesions, or wart-like growths that are itchy, tender, crusting, scabbing, eroding, oozing or bleeding.    Open sores/wounds or reddish/irritated areas that do not heal within 2-3 weeks.    Scar-like areas that are white, yellow or waxy in color.

## 2019-08-07 ENCOUNTER — ANCILLARY PROCEDURE (OUTPATIENT)
Dept: GENERAL RADIOLOGY | Facility: CLINIC | Age: 44
End: 2019-08-07
Attending: FAMILY MEDICINE
Payer: COMMERCIAL

## 2019-08-07 ENCOUNTER — OFFICE VISIT (OUTPATIENT)
Dept: ORTHOPEDICS | Facility: CLINIC | Age: 44
End: 2019-08-07
Payer: COMMERCIAL

## 2019-08-07 VITALS
BODY MASS INDEX: 21.52 KG/M2 | HEIGHT: 68 IN | SYSTOLIC BLOOD PRESSURE: 114 MMHG | DIASTOLIC BLOOD PRESSURE: 78 MMHG | WEIGHT: 142 LBS

## 2019-08-07 DIAGNOSIS — M75.21 BICEPS TENDINITIS OF RIGHT UPPER EXTREMITY: ICD-10-CM

## 2019-08-07 DIAGNOSIS — M25.511 PAIN IN JOINT OF RIGHT SHOULDER: Primary | ICD-10-CM

## 2019-08-07 DIAGNOSIS — M25.511 PAIN IN JOINT OF RIGHT SHOULDER: ICD-10-CM

## 2019-08-07 PROCEDURE — 73030 X-RAY EXAM OF SHOULDER: CPT | Mod: RT

## 2019-08-07 PROCEDURE — 99203 OFFICE O/P NEW LOW 30 MIN: CPT | Performed by: FAMILY MEDICINE

## 2019-08-07 ASSESSMENT — MIFFLIN-ST. JEOR: SCORE: 1332.61

## 2019-08-07 NOTE — LETTER
"    8/7/2019         RE: Domonique Masters  [address] [place] [address]        Dear Colleague,    Thank you for referring your patient, Domonique Masters, to the [facility]. Please see a copy of my visit note below.    ASSESSMENT & PLAN    1. Pain in joint of right shoulder    2. Biceps tendinitis of right upper extremity      Seen for acute right shoulder pain, atraumatic  No concern for rotator cuff tearing and or long head rupture   Physical therapy: [facility] - [phone]    Follow-up as needed.    -----    SUBJECTIVE  Domonique Masters is a/an 44 year old Right handed female who is seen as a self referral for evaluation of right shoulder pain. The patient is seen by themselves.    Onset: 4 month(s) ago. Reports insidious onset without acute precipitating event.  Location of Pain: right lateral upper arm  Rating of Pain at worst: 8/10  Rating of Pain Currently: 0/10  Worsened by: reaching into the backseat of the car, putting on bra  Better with: avoiding painful movements  Treatments tried: stretching, foam rolling, Aleve  Associated symptoms: no distal numbness or tingling; denies swelling or warmth  Orthopedic history: NO  Relevant surgical history: NO  Patient Social History: works at a garden [employer]    Patient's past medical, surgical, social, and family histories were reviewed today and no changes are noted.    REVIEW OF SYSTEMS:  10 point ROS is negative other than symptoms noted above in HPI, Past Medical History or as stated below  Constitutional: NEGATIVE for fever, chills, change in weight  Skin: NEGATIVE for worrisome rashes, moles or lesions  GI/: NEGATIVE for bowel or bladder changes  Neuro: NEGATIVE for weakness, dizziness or paresthesias    OBJECTIVE:  /78   Ht 1.727 m (5' 8\")   Wt 64.4 kg (142 lb)   BMI 21.59 kg/m      General: healthy, alert and in no distress  HEENT: no scleral icterus or conjunctival erythema  Skin: no suspicious lesions or rash. " No jaundice.  CV: regular rhythm by palpation  Resp: normal respiratory effort without conversational dyspnea   Psych: normal mood and affect  Gait: normal steady gait with appropriate coordination and balance  Neuro: normal light touch sensory exam of the bilateral upper extremities.    MSK:  RIGHT SHOULDER  Inspection:    no atrophy  Palpation:    Tender about the proximal biceps tendon.  Nontender at  AC joint, , greater tuberosity, supraspinatus insertion and infraspinatus insertion. Remainder of bony and tendinous landmarks are nontender.  Active Range of Motion:     Abduction 1650, FF 1800, , IR painful.    Strength:    Scapular plane abduction 5/5,  ER 5/5, IR 5/5, biceps 5-/5, painful, triceps 5/5  Special Tests:    Positive: Speed's    Negative: Piña', supraspinatus (empty can) and crossed arm adduction    Independent visualization of the below image:  Recent Results (from the past 24 hour(s))   XR Shoulder Right G/E 3 Views    Narrative    Examination:  XR SHOULDER RT G/E 3 VW    Date:  8/7/2019 2:40 PM     Clinical Information: Right shoulder pain.     Comparison: None.    Findings: Normal glenohumeral joint spacing and alignment. Normal  subacromial space distance. Unremarkable acromioclavicular joint. No  soft tissue abnormality is evident.      Impression    Impression: Unremarkable right shoulder.    MD Caryl DACOSTA DO [facility]      Again, thank you for allowing me to participate in the care of your patient.        Sincerely,        Caryl Olivera DO

## 2019-08-07 NOTE — PROGRESS NOTES
"ASSESSMENT & PLAN    1. Pain in joint of right shoulder    2. Biceps tendinitis of right upper extremity      Seen for acute right shoulder pain, atraumatic  No concern for rotator cuff tearing and or long head rupture   Physical therapy: [facility] - [phone]    Follow-up as needed.    -----    SUBJECTIVE  Domonique Masters is a/an 44 year old Right handed female who is seen as a self referral for evaluation of right shoulder pain. The patient is seen by themselves.    Onset: 4 month(s) ago. Reports insidious onset without acute precipitating event.  Location of Pain: right lateral upper arm  Rating of Pain at worst: 8/10  Rating of Pain Currently: 0/10  Worsened by: reaching into the backseat of the car, putting on bra  Better with: avoiding painful movements  Treatments tried: stretching, foam rolling, Aleve  Associated symptoms: no distal numbness or tingling; denies swelling or warmth  Orthopedic history: NO  Relevant surgical history: NO  Patient Social History: works at a [employer]    Patient's past medical, surgical, social, and family histories were reviewed today and no changes are noted.    REVIEW OF SYSTEMS:  10 point ROS is negative other than symptoms noted above in HPI, Past Medical History or as stated below  Constitutional: NEGATIVE for fever, chills, change in weight  Skin: NEGATIVE for worrisome rashes, moles or lesions  GI/: NEGATIVE for bowel or bladder changes  Neuro: NEGATIVE for weakness, dizziness or paresthesias    OBJECTIVE:  /78   Ht 1.727 m (5' 8\")   Wt 64.4 kg (142 lb)   BMI 21.59 kg/m     General: healthy, alert and in no distress  HEENT: no scleral icterus or conjunctival erythema  Skin: no suspicious lesions or rash. No jaundice.  CV: regular rhythm by palpation  Resp: normal respiratory effort without conversational dyspnea   Psych: normal mood and affect  Gait: normal steady gait with appropriate coordination and balance  Neuro: normal light touch " sensory exam of the bilateral upper extremities.    MSK:  RIGHT SHOULDER  Inspection:    no atrophy  Palpation:    Tender about the proximal biceps tendon.  Nontender at  AC joint, , greater tuberosity, supraspinatus insertion and infraspinatus insertion. Remainder of bony and tendinous landmarks are nontender.  Active Range of Motion:     Abduction 1650, FF 1800, , IR painful.    Strength:    Scapular plane abduction 5/5,  ER 5/5, IR 5/5, biceps 5-/5, painful, triceps 5/5  Special Tests:    Positive: Speed's    Negative: Piña', supraspinatus (empty can) and crossed arm adduction    Independent visualization of the below image:  Recent Results (from the past 24 hour(s))   XR Shoulder Right G/E 3 Views    Narrative    Examination:  XR SHOULDER RT G/E 3 VW    Date:  8/7/2019 2:40 PM     Clinical Information: Right shoulder pain.     Comparison: None.    Findings: Normal glenohumeral joint spacing and alignment. Normal  subacromial space distance. Unremarkable acromioclavicular joint. No  soft tissue abnormality is evident.      Impression    Impression: Unremarkable right shoulder.    MD Caryl DACOSTA, DO [facility]

## 2019-08-07 NOTE — PATIENT INSTRUCTIONS
1. Pain in joint of right shoulder    2. Biceps tendinitis of right upper extremity        Physical therapy: [facility] - [phone]    Follow-up as needed.

## 2019-08-09 ENCOUNTER — THERAPY VISIT (OUTPATIENT)
Dept: PHYSICAL THERAPY | Facility: CLINIC | Age: 44
End: 2019-08-09
Payer: COMMERCIAL

## 2019-08-09 DIAGNOSIS — M25.511 PAIN IN JOINT OF RIGHT SHOULDER: ICD-10-CM

## 2019-08-09 DIAGNOSIS — M75.21 BICEPS TENDINITIS OF RIGHT UPPER EXTREMITY: ICD-10-CM

## 2019-08-09 PROCEDURE — 97110 THERAPEUTIC EXERCISES: CPT | Mod: GP | Performed by: PHYSICAL THERAPIST

## 2019-08-09 PROCEDURE — 97161 PT EVAL LOW COMPLEX 20 MIN: CPT | Mod: GP | Performed by: PHYSICAL THERAPIST

## 2019-08-09 PROCEDURE — 97112 NEUROMUSCULAR REEDUCATION: CPT | Mod: GP | Performed by: PHYSICAL THERAPIST

## 2019-08-09 NOTE — PROGRESS NOTES
Physical Therapy Initial Evaluation  August 9, 2019     Precautions/Restrictions/MD instructions: PT eval and treat.      Subjective:   Date of Onset: April of 2019, MD order on 8/7/19  C/C: right anterior and lateral shoulder pain  Quality of pain is aching and sharp. Pains are described as intermittent in nature. Pain is worse: with increased use, no specific time of day. Pain is rated 5-9/10 with movement.   History of symptoms: Pains began suddenly as the result of unknown origins. She thinks pain may be related to overhead lifts at the gym such as  press and snatches. Since onset, symptoms are unchanged. Previous episodes: no history of right shoulder pain, intermittent neck tightness  Worsened by: reaching out to the side, reaching behind her back, laying on her right side, lifting overhead   Alleviated by: Rest and tennis ball self myofascial release with a tennis ball or foam roller.   General health as reported by patient: excellent  Pertinent medical/surgical history: HTN. She denies night pain, significant current illness or recent hospital admission. She denies any regional implanted devices. She denies history of surgery in the area.  Imaging: x-ray - unremarkable Current occupational status: /operations. Patient's goals are: decrease pain, participate in burpees, snatches, overhead press activities up to 30#, Improve tolerance to gardening. Return to MD:  PRN.     Objective:  SHOULDER:    Cervical Screen:   ROM: Flexion and Extension: WNL, reduced end range rotation and side bned  Spurlings: negative    Shoulder:   AROM L AROM R MMT L MMT R   Flex 190 154 (improves to 165 following PT) 5/5 4+/5*   Abd 185 178 4+/5 4+/5*   Extension 72 40 5/5 4+/5*   IR   5/5 5/5    70 5/5 5/5   Ext/IR T3 L1     Strength: Rhomboids/Middle trap: 4-/5    Scapulothoraic Rhythm: Significant scapular dyskinesia with medial border prominence bilaterally.    Special tests:   R   Impingement     Neer's -   Hawkin's-Michael +   Painful Arc of Motion +   Pain with resisted ER +   Biceps     Speed's +     Assessment/Plan:    The patient is a 44 year old female with scapular dyskinesia and right shoulder pain consistent with biceps tendonitis.    The patient has the following significant findings with corresponding treatment plan.  Diagnosis 1:  Right biceps tendonitis    Pain -  manual therapy, self management, education, directional preference exercise and home program  Decreased ROM/flexibility - manual therapy and therapeutic exercise  Decreased strength - therapeutic exercise and therapeutic activities  Decreased proprioception - neuro re-education and therapeutic activities  Impaired muscle performance - neuro re-education  Decreased function - therapeutic activities  Impaired posture - neuro re-education    Therapy Evaluation Codes:   Cumulative Therapy Evaluation is: Low complexity.    Previous and current functional limitations:  (See Goal Flow Sheet for this information)    Short term and Long term goals: (See Goal Flow Sheet for this information)     Communication ability:  Patient appears to be able to clearly communicate and understand verbal and written communication and follow directions correctly.  Treatment Explanation - The following has been discussed with the patient: RX ordered/plan of care, anticipated outcomes, and possible risks and side effects.  This patient would benefit from PT intervention to resume normal activities.   Rehab potential is good.    Frequency:  1 X week, once daily  Duration:  for 4 weeks tapering to 2 X a month over 1 weeks  Discharge Plan: Achieve all LTGs, be independent in home treatment program, and reach maximal therapeutic benefit.    Please refer to the daily flowsheet for treatment today, total treatment time and time spent performing 1:1 timed codes.

## 2019-08-16 ENCOUNTER — THERAPY VISIT (OUTPATIENT)
Dept: PHYSICAL THERAPY | Facility: CLINIC | Age: 44
End: 2019-08-16
Payer: COMMERCIAL

## 2019-08-16 DIAGNOSIS — M75.21 BICEPS TENDINITIS OF RIGHT UPPER EXTREMITY: ICD-10-CM

## 2019-08-16 DIAGNOSIS — M25.511 PAIN IN JOINT OF RIGHT SHOULDER: ICD-10-CM

## 2019-08-16 PROCEDURE — 97110 THERAPEUTIC EXERCISES: CPT | Mod: GP | Performed by: PHYSICAL THERAPIST

## 2019-08-16 PROCEDURE — 97140 MANUAL THERAPY 1/> REGIONS: CPT | Mod: GP | Performed by: PHYSICAL THERAPIST

## 2019-08-16 PROCEDURE — 97112 NEUROMUSCULAR REEDUCATION: CPT | Mod: GP | Performed by: PHYSICAL THERAPIST

## 2019-08-22 ENCOUNTER — THERAPY VISIT (OUTPATIENT)
Dept: PHYSICAL THERAPY | Facility: CLINIC | Age: 44
End: 2019-08-22
Payer: COMMERCIAL

## 2019-08-22 DIAGNOSIS — M25.511 PAIN IN JOINT OF RIGHT SHOULDER: ICD-10-CM

## 2019-08-22 DIAGNOSIS — M75.21 BICEPS TENDINITIS OF RIGHT UPPER EXTREMITY: ICD-10-CM

## 2019-08-22 PROCEDURE — 97112 NEUROMUSCULAR REEDUCATION: CPT | Mod: GP | Performed by: PHYSICAL THERAPIST

## 2019-08-22 PROCEDURE — 97110 THERAPEUTIC EXERCISES: CPT | Mod: GP | Performed by: PHYSICAL THERAPIST

## 2019-08-30 ENCOUNTER — THERAPY VISIT (OUTPATIENT)
Dept: PHYSICAL THERAPY | Facility: CLINIC | Age: 44
End: 2019-08-30
Payer: COMMERCIAL

## 2019-08-30 DIAGNOSIS — M25.511 PAIN IN JOINT OF RIGHT SHOULDER: ICD-10-CM

## 2019-08-30 DIAGNOSIS — M75.21 BICEPS TENDINITIS OF RIGHT UPPER EXTREMITY: ICD-10-CM

## 2019-08-30 PROCEDURE — 97112 NEUROMUSCULAR REEDUCATION: CPT | Mod: GP | Performed by: PHYSICAL THERAPIST

## 2019-08-30 PROCEDURE — 97110 THERAPEUTIC EXERCISES: CPT | Mod: GP | Performed by: PHYSICAL THERAPIST

## 2019-09-06 ENCOUNTER — THERAPY VISIT (OUTPATIENT)
Dept: PHYSICAL THERAPY | Facility: CLINIC | Age: 44
End: 2019-09-06
Payer: COMMERCIAL

## 2019-09-06 DIAGNOSIS — M75.21 BICEPS TENDINITIS OF RIGHT UPPER EXTREMITY: ICD-10-CM

## 2019-09-06 DIAGNOSIS — M25.511 PAIN IN JOINT OF RIGHT SHOULDER: ICD-10-CM

## 2019-09-06 PROCEDURE — 97112 NEUROMUSCULAR REEDUCATION: CPT | Mod: GP | Performed by: PHYSICAL THERAPIST

## 2019-09-06 PROCEDURE — 97110 THERAPEUTIC EXERCISES: CPT | Mod: GP | Performed by: PHYSICAL THERAPIST

## 2019-09-20 ENCOUNTER — THERAPY VISIT (OUTPATIENT)
Dept: PHYSICAL THERAPY | Facility: CLINIC | Age: 44
End: 2019-09-20
Payer: COMMERCIAL

## 2019-09-20 DIAGNOSIS — M25.511 PAIN IN JOINT OF RIGHT SHOULDER: ICD-10-CM

## 2019-09-20 DIAGNOSIS — M75.21 BICEPS TENDINITIS OF RIGHT UPPER EXTREMITY: ICD-10-CM

## 2019-09-20 PROCEDURE — 97110 THERAPEUTIC EXERCISES: CPT | Mod: GP | Performed by: PHYSICAL THERAPIST

## 2019-09-20 PROCEDURE — 97112 NEUROMUSCULAR REEDUCATION: CPT | Mod: GP | Performed by: PHYSICAL THERAPIST

## 2019-10-11 ENCOUNTER — THERAPY VISIT (OUTPATIENT)
Dept: PHYSICAL THERAPY | Facility: CLINIC | Age: 44
End: 2019-10-11
Payer: COMMERCIAL

## 2019-10-11 DIAGNOSIS — M25.511 PAIN IN JOINT OF RIGHT SHOULDER: ICD-10-CM

## 2019-10-11 DIAGNOSIS — M75.21 BICEPS TENDINITIS OF RIGHT UPPER EXTREMITY: ICD-10-CM

## 2019-10-11 PROCEDURE — 97530 THERAPEUTIC ACTIVITIES: CPT | Mod: GP | Performed by: PHYSICAL THERAPIST

## 2019-10-11 PROCEDURE — 97110 THERAPEUTIC EXERCISES: CPT | Mod: GP | Performed by: PHYSICAL THERAPIST

## 2019-10-11 PROCEDURE — 97112 NEUROMUSCULAR REEDUCATION: CPT | Mod: GP | Performed by: PHYSICAL THERAPIST

## 2019-10-11 NOTE — PROGRESS NOTES
Discharge Note    Domonique was seen for 7 visits of physical therapy.    SUBJECTIVE  Subjective changes noted by patient:  Domonique reports she no longer gets the original pain in her shoulder. She feels muscle fatigue with workouts and some strain with end range of motion exercises. She reports an 80-90% improvement in symptoms and return to function. She has been able to incorporate light kettle bell swing and overhead press.  She is slowly increasing weights to pre injury levels  Current pain level is 0/10.     Previous pain level was  5/10.   Changes in function:  Yes (See Goal flowsheet attached for changes in current functional level)  Adverse reaction to treatment or activity: None    OBJECTIVE  Changes noted in objective findings: Shoulder ROM: Symmetrical in flexion, ER, ABD, EXT.  Left ER to 102 and IR to 102.  Right ER to 95, IR to 104.  Tolerates overhead press with 5-8# with good form and no pain.  Tolerates burpees without symptoms at a slow pace.     ASSESSMENT/PLAN  Diagnosis: right biceps tendonitis   Updated problem list and treatment plan:   Decreased ROM/flexibility - HEP  Decreased strength - HEP  Impaired muscle performance - HEP  Decreased proprioception - HEP  STG/LTGs have been met or progress has been made towards goals:  Yes, please see goal flowsheet for most current information  Assessment of Progress: Domonique has met her long term goals.    Self Management Plans:  HEP  I have re-evaluated this patient and find that the nature, scope, duration and intensity of the therapy is appropriate for the medical condition of the patient.  Domonique continues to require the following intervention to meet STG and LTG's:  HEP.    Recommendations:  Discharge with current home program.  Patient to follow up with MD as needed.    Please refer to the daily flowsheet for treatment today, total treatment time and time spent performing 1:1 timed codes.

## 2019-11-05 ENCOUNTER — HEALTH MAINTENANCE LETTER (OUTPATIENT)
Age: 44
End: 2019-11-05

## 2020-01-22 DIAGNOSIS — Z12.31 VISIT FOR SCREENING MAMMOGRAM: ICD-10-CM

## 2020-01-22 PROCEDURE — 77063 BREAST TOMOSYNTHESIS BI: CPT | Mod: TC

## 2020-01-22 PROCEDURE — 77067 SCR MAMMO BI INCL CAD: CPT | Mod: TC

## 2020-02-25 NOTE — PATIENT INSTRUCTIONS
"FUTURE APPOINTMENTS  Follow up in 1 year(s) for a full-body skin cancer screening.    SUN PROTECTION INSTRUCTIONS  Sun damage can lead to skin cancer and premature aging of the skin.      The best way to protect from sun damage to your skin is to avoid the sun during peak hours (10 am - 2 pm) even on overcast days.    Never use tanning beds. Tanning beds are associated with much higher risks of skin cancer.    All tanning damages the skin. Aim for ivory skin year round and you will have less trouble with your skin in years to come. There is no merit in getting \"a base tan\" before a warm weather vacation, as any tanning indicates your body's response to sun damage.    Stop smoking. Smokers have higher rates of skin cancer and also have premature skin wrinkling.    Use UPF sun-protective clothing, which while more expensive initially provides longer lasting coverage without having to worry about remembering to re-apply.  1. Wear a wide-brimmed hat and sunglasses.   2. Wear sun-protective clothing.  Collexpo and other e-Tag make sun protective clothing that are stylish, comfortable and cool.   Vanksen and other e-Tag make UV arm sleeves suitable for golfing, gardening and other activities.    Sunscreen instructions:  1. Use sunscreens with Zinc Oxide, Titanium Dioxide or Avobenzone to protect from UVA rays.  2. Use SPF 30-50+ to protect from UVB rays.  3. Re-apply every 2 hours even if water resistant.  4. Apply on your face every day even when cloudy and even in the winter. UVA \"aging rays\" penetrate window glass and are just as strong in the winter as in the summer.    FYI  You should use about 3 tablespoons of sunscreen to protect your whole body. Thus a typical eight ounce bottle of sunscreen should last 4 applications. Remember, that the SPF rating is compromised if you don t apply enough. Most people only apply 1/2 - 1/3 of the amount they need. Also don t forget areas such as your ears, " feet, upper back and harder to reach places. Keep in mind that these amounts should be increased for larger body sizes.    Sunscreens with titanium dioxide and/or zinc oxide in the active ingredients are physical blockers as opposed to chemical blockers. Chemical-free sunscreens should not irritate the skin.    Spray-on sunscreens may be used for touch-up application only, not as a base layer. Also, use with caution around small children due to inhalation risk.    SPF means sun protection factor, which is just the degree to which the sunscreen can protect against UVB rays. There is no rating system for UVA rays. SPF is calculated as the time skin will burn when sunscreen is applied vs. skin without sunscreen.    Water resistant sunscreens should be re-applied every 1-2 hours.    Product Recommendations:    Consider use of sunscreen sticks with Zinc Oxide and Titanium Dioxide active ingredients such as Neutrogena Pure&Free Baby Sunscreen Stick.    Good examples include: Blue Lizard, EltaMD, Solbar    Good daily moisturizers with SPF: Vanicream, CeraVe.    For sensitive skin, consider : SkinMedica Essential Defense Mineral Shield Broad Spectrum SPF 35    Men: consider use of Neutrogena Triple Protect Facial Lotion    Avoid retinyl palmitate products.  Avoid combination products that include both sunscreen and insect repellant, as sunscreen should be applied every 2 hours, but insect repellant should not be applied as frequently.    For more information:  https://www.skincancer.org/prevention/sun-protection/sunscreen/sunscreens-safe-and-effective    SKIN CANCER SELF-EXAM INSTRUCTIONS  Check every month in the mirror or with a household member. Be aware of any changes, especially bleeding or tenderness. Also, make sure to check your nails for color changes after removal of nail polish.    For melanoma, check for:  A - Asymmetry. One half unlike the other half.  B - Border. Irregular, scalloped, ragged, notched, blurred  or poorly defined borders.  C - Color. Color variations from one area to another, with shades of tan, brown and/or black present. Sometimes white, red or blue.  D - Diameter. Greater than 6 mm (about the size of a pencil eraser). Any new growth of a mole should be concerning and be evaluated.  E - Evolving. A mole or skin lesion that looks different from the rest or is changing in size, shape or color.    For basal cell carcinoma and squamous cell carcinoma, check for:    Sores, shiny bumps, nodules, scaly lesions, or wart-like growths that are itchy, tender, crusting, scabbing, eroding, oozing or bleeding.    Open sores/wounds or reddish/irritated areas that do not heal within 2-3 weeks.    Scar-like areas that are white, yellow or waxy in color.

## 2020-03-02 ENCOUNTER — OFFICE VISIT (OUTPATIENT)
Dept: FAMILY MEDICINE | Facility: CLINIC | Age: 45
End: 2020-03-02
Payer: COMMERCIAL

## 2020-03-02 VITALS
TEMPERATURE: 99.5 F | DIASTOLIC BLOOD PRESSURE: 61 MMHG | BODY MASS INDEX: 21.37 KG/M2 | HEIGHT: 68 IN | OXYGEN SATURATION: 100 % | WEIGHT: 141 LBS | HEART RATE: 66 BPM | SYSTOLIC BLOOD PRESSURE: 109 MMHG

## 2020-03-02 VITALS — SYSTOLIC BLOOD PRESSURE: 112 MMHG | DIASTOLIC BLOOD PRESSURE: 64 MMHG

## 2020-03-02 DIAGNOSIS — Z13.29 SCREENING FOR THYROID DISORDER: ICD-10-CM

## 2020-03-02 DIAGNOSIS — F41.9 ANXIETY: ICD-10-CM

## 2020-03-02 DIAGNOSIS — Z97.5 IUD (INTRAUTERINE DEVICE) IN PLACE: ICD-10-CM

## 2020-03-02 DIAGNOSIS — I10 BENIGN HYPERTENSION: ICD-10-CM

## 2020-03-02 DIAGNOSIS — G25.0 BENIGN ESSENTIAL TREMOR: ICD-10-CM

## 2020-03-02 DIAGNOSIS — D22.9 NUMEROUS MOLES: ICD-10-CM

## 2020-03-02 DIAGNOSIS — Z13.220 SCREENING FOR LIPID DISORDERS: ICD-10-CM

## 2020-03-02 DIAGNOSIS — L57.8 ACTINIC SKIN DAMAGE: ICD-10-CM

## 2020-03-02 DIAGNOSIS — Z12.83 SKIN CANCER SCREENING: Primary | ICD-10-CM

## 2020-03-02 DIAGNOSIS — Z79.899 CONTROLLED SUBSTANCE AGREEMENT SIGNED: ICD-10-CM

## 2020-03-02 DIAGNOSIS — Z00.00 ROUTINE GENERAL MEDICAL EXAMINATION AT A HEALTH CARE FACILITY: Primary | ICD-10-CM

## 2020-03-02 DIAGNOSIS — D22.9 MULTIPLE BENIGN MELANOCYTIC NEVI: ICD-10-CM

## 2020-03-02 DIAGNOSIS — Z85.828 HISTORY OF BASAL CELL CARCINOMA: ICD-10-CM

## 2020-03-02 LAB
ANION GAP SERPL CALCULATED.3IONS-SCNC: 7 MMOL/L (ref 3–14)
BUN SERPL-MCNC: 7 MG/DL (ref 7–30)
CALCIUM SERPL-MCNC: 8.9 MG/DL (ref 8.5–10.1)
CHLORIDE SERPL-SCNC: 108 MMOL/L (ref 94–109)
CHOLEST SERPL-MCNC: 169 MG/DL
CO2 SERPL-SCNC: 25 MMOL/L (ref 20–32)
CREAT SERPL-MCNC: 0.74 MG/DL (ref 0.52–1.04)
GFR SERPL CREATININE-BSD FRML MDRD: >90 ML/MIN/{1.73_M2}
GLUCOSE SERPL-MCNC: 86 MG/DL (ref 70–99)
HDLC SERPL-MCNC: 59 MG/DL
LDLC SERPL CALC-MCNC: 96 MG/DL
NONHDLC SERPL-MCNC: 110 MG/DL
POTASSIUM SERPL-SCNC: 3.7 MMOL/L (ref 3.4–5.3)
SODIUM SERPL-SCNC: 139 MMOL/L (ref 133–144)
T4 FREE SERPL-MCNC: 0.86 NG/DL (ref 0.76–1.46)
TRIGL SERPL-MCNC: 71 MG/DL
TSH SERPL DL<=0.005 MIU/L-ACNC: 4.36 MU/L (ref 0.4–4)

## 2020-03-02 PROCEDURE — 99214 OFFICE O/P EST MOD 30 MIN: CPT | Mod: 25 | Performed by: INTERNAL MEDICINE

## 2020-03-02 PROCEDURE — 80048 BASIC METABOLIC PNL TOTAL CA: CPT | Performed by: INTERNAL MEDICINE

## 2020-03-02 PROCEDURE — 84443 ASSAY THYROID STIM HORMONE: CPT | Performed by: INTERNAL MEDICINE

## 2020-03-02 PROCEDURE — 84439 ASSAY OF FREE THYROXINE: CPT | Performed by: INTERNAL MEDICINE

## 2020-03-02 PROCEDURE — 99396 PREV VISIT EST AGE 40-64: CPT | Performed by: INTERNAL MEDICINE

## 2020-03-02 PROCEDURE — 36415 COLL VENOUS BLD VENIPUNCTURE: CPT | Performed by: INTERNAL MEDICINE

## 2020-03-02 PROCEDURE — 99213 OFFICE O/P EST LOW 20 MIN: CPT | Performed by: FAMILY MEDICINE

## 2020-03-02 PROCEDURE — 80061 LIPID PANEL: CPT | Performed by: INTERNAL MEDICINE

## 2020-03-02 RX ORDER — METOPROLOL SUCCINATE 50 MG/1
50 TABLET, EXTENDED RELEASE ORAL DAILY
Qty: 90 TABLET | Refills: 3 | Status: SHIPPED | OUTPATIENT
Start: 2020-03-02

## 2020-03-02 RX ORDER — LORAZEPAM 0.5 MG/1
0.5 TABLET ORAL PRN
Qty: 60 TABLET | Refills: 0 | Status: SHIPPED | OUTPATIENT
Start: 2020-03-02

## 2020-03-02 ASSESSMENT — MIFFLIN-ST. JEOR: SCORE: 1323.07

## 2020-03-02 NOTE — PROGRESS NOTES
"   SUBJECTIVE:   CC: Domonique Masters is an 45 year old woman who presents for preventive health visit.     Healthy Habits:    Do you get at least three servings of calcium containing foods daily (dairy, green leafy vegetables, etc.)? { :076055::\"yes\"}    Amount of exercise or daily activities, outside of work: { :845059}    Problems taking medications regularly { :438992::\"No\"}    Medication side effects: { :493027::\"No\"}    Have you had an eye exam in the past two years? { :046278}    Do you see a dentist twice per year? { :103047}    Do you have sleep apnea, excessive snoring or daytime drowsiness?{ :779306}  {Outside tests to abstract? :540965}    {additional problems to add (Optional):281316}    Today's PHQ-2 Score:   PHQ-2 ( 1999 Pfizer) 3/1/2020 2/15/2019   Q1: Little interest or pleasure in doing things 0 0   Q2: Feeling down, depressed or hopeless 0 0   PHQ-2 Score 0 0   Q1: Little interest or pleasure in doing things Not at all -   Q2: Feeling down, depressed or hopeless Not at all -   PHQ-2 Score 0 -     {PHQ-2 LOOK IN ASSESSMENTS (Optional) :510436}  Abuse: Current or Past(Physical, Sexual or Emotional)- {YES/NO/NA:092884}  Do you feel safe in your environment? {YES/NO/NA:738250}        Social History     Tobacco Use     Smoking status: Never Smoker     Smokeless tobacco: Never Used   Substance Use Topics     Alcohol use: No     Alcohol/week: 0.0 standard drinks     Comment: recovering alcoholic     If you drink alcohol do you typically have >3 drinks per day or >7 drinks per week? {ETOH :303826}                     Reviewed orders with patient.  Reviewed health maintenance and updated orders accordingly - {Yes/No:548456::\"Yes\"}  {Chronicprobdata (Optional):632746}    {Mammo Decision Support (Optional):603897}    Pertinent mammograms are reviewed under the imaging tab.  History of abnormal Pap smear: {PAP HX:235689}  PAP / HPV Latest Ref Rng & Units 2/2/2018 12/2/2015 11/22/2005   PAP - NIL NIL NIL   HPV 16 " "DNA NEG:Negative Negative - -   HPV 18 DNA NEG:Negative Negative - -   OTHER HR HPV NEG:Negative Negative - -     Reviewed and updated as needed this visit by clinical staff         Reviewed and updated as needed this visit by Provider        {HISTORY OPTIONS (Optional):506542}    ROS:  { :063508}    OBJECTIVE:   There were no vitals taken for this visit.  EXAM:  {Exam Choices:556434}    {Diagnostic Test Results (Optional):859566::\"Diagnostic Test Results:\",\"Labs reviewed in Epic\"}    ASSESSMENT/PLAN:   {Diag Picklist:360323}    COUNSELING:   {FEMALE COUNSELING MESSAGES:799820::\"Reviewed preventive health counseling, as reflected in patient instructions\"}    Estimated body mass index is 21.59 kg/m  as calculated from the following:    Height as of 8/7/19: 1.727 m (5' 8\").    Weight as of 8/7/19: 64.4 kg (142 lb).    {Weight Management Plan (ACO) Complete if BMI is abnormal-  Ages 18-64  BMI >24.9.  Age 65+ with BMI <23 or >30 (Optional):903678}     reports that she has never smoked. She has never used smokeless tobacco.  {Tobacco Cessation -- Complete if patient is a smoker (Optional):767774}    Counseling Resources:  ATP IV Guidelines  Pooled Cohorts Equation Calculator  Breast Cancer Risk Calculator  FRAX Risk Assessment  ICSI Preventive Guidelines  Dietary Guidelines for Americans, 2010  USDA's MyPlate  ASA Prophylaxis  Lung CA Screening    Maria Elena Root MD  [facility]  "

## 2020-03-02 NOTE — PROGRESS NOTES
SUBJECTIVE:   CC: Domonique Masters is an 45 year old woman who presents for preventive health visit.     Healthy Habits:     Getting at least 3 servings of Calcium per day:  Yes    Bi-annual eye exam:  Yes    Dental care twice a year:  Yes    Sleep apnea or symptoms of sleep apnea:  None    Diet:  Regular (no restrictions)    Frequency of exercise:  6-7 days/week    Duration of exercise:  30-45 minutes    Taking medications regularly:  Yes    Medication side effects:  None    PHQ-2 Total Score: 0    Additional concerns today:  Yes            Today's PHQ-2 Score:   PHQ-2 ( 1999 [employer]) 3/1/2020   Q1: Little interest or pleasure in doing things 0   Q2: Feeling down, depressed or hopeless 0   PHQ-2 Score 0   Q1: Little interest or pleasure in doing things Not at all   Q2: Feeling down, depressed or hopeless Not at all   PHQ-2 Score 0       Abuse: Current or Past(Physical, Sexual or Emotional)- No  Do you feel safe in your environment? Yes        Social History     Tobacco Use     Smoking status: Never Smoker     Smokeless tobacco: Never Used   Substance Use Topics     Alcohol use: No     Alcohol/week: 0.0 standard drinks     Comment: recovering alcoholic     If you drink alcohol do you typically have >3 drinks per day or >7 drinks per week? No    Alcohol Use 3/2/2020   Prescreen: >3 drinks/day or >7 drinks/week? -   Prescreen: >3 drinks/day or >7 drinks/week? No       Reviewed orders with patient.  Reviewed health maintenance and updated orders accordingly - Yes  Labs reviewed in EPIC  Patient Active Problem List   Diagnosis     Stricture and stenosis of esophagus     Benign hypertension     Anxiety     Elevated liver enzymes     Abnormal TSH     Benign essential tremor     CARDIOVASCULAR SCREENING; LDL GOAL LESS THAN 130     GERD (gastroesophageal reflux disease)     IUD (intrauterine device) in place     History of basal cell carcinoma     Family history of skin cancer     Past Surgical History:   Procedure Laterality  Date     C APPENDECTOMY  1983     ESOPHAGOSCOPY, GASTROSCOPY, DUODENOSCOPY (EGD), COMBINED N/A 9/9/2014    Procedure: COMBINED ESOPHAGOSCOPY, GASTROSCOPY, DUODENOSCOPY (EGD), BIOPSY SINGLE OR MULTIPLE;  Surgeon: Gilberto Soto MD;  Location:  GI     GYN SURGERY      left ovarian cyst removed     UPPER GI ENDOSCOPY PERFORMED      x2       Social History     Tobacco Use     Smoking status: Never Smoker     Smokeless tobacco: Never Used   Substance Use Topics     Alcohol use: No     Alcohol/week: 0.0 standard drinks     Comment: recovering alcoholic     Family History   Problem Relation Age of Onset     Genitourinary Problems Father         ESRD on dialysis     Cardiovascular Father      Coronary Artery Disease Father      Cardiovascular Paternal Grandfather      Breast Cancer No family hx of      Cancer - colorectal No family hx of      Colon Cancer No family hx of          Current Outpatient Medications   Medication Sig Dispense Refill     levonorgestrel (MIRENA) 20 MCG/24HR IUD 1 each by Intrauterine route once       LORazepam (ATIVAN) 0.5 MG tablet Take 1 tablet (0.5 mg) by mouth as needed 60 tablet 0     LYSINE 1 qod       metoprolol succinate ER (TOPROL-XL) 50 MG 24 hr tablet Take 1 tablet (50 mg) by mouth daily 90 tablet 3     MULTIVITAMIN OR 1 qd       Allergies   Allergen Reactions     Retin-A [Tretinoin] Rash       Mammogram Screening: Patient under age 50, mutual decision reflected in health maintenance.      Pertinent mammograms are reviewed under the imaging tab.  History of abnormal Pap smear: NO - age 30-65 PAP every 5 years with negative HPV co-testing recommended  PAP / HPV Latest Ref Rng & Units 2/2/2018 12/2/2015 11/22/2005   PAP - NIL NIL NIL   HPV 16 DNA NEG:Negative Negative - -   HPV 18 DNA NEG:Negative Negative - -   OTHER HR HPV NEG:Negative Negative - -     Reviewed and updated as needed this visit by clinical staff  Tobacco  Allergies  Soc Hx        Reviewed and updated as needed this  "visit by Provider            Review of Systems  CONSTITUTIONAL: NEGATIVE for fever, chills, change in weight  INTEGUMENTARU/SKIN: NEGATIVE for worrisome rashes, moles or lesions POSITIVE for history of basal cell carcinoma and family Hx of skin cancer, follows with dermatologist Dr. Mullins  EYES: NEGATIVE for vision changes or irritation  ENT: NEGATIVE for ear, mouth and throat problems  RESP: NEGATIVE for significant cough or SOB  BREAST: NEGATIVE for masses, tenderness or discharge  CV: NEGATIVE for chest pain, palpitations or peripheral edema  GI: NEGATIVE for nausea, abdominal pain, heartburn, or change in bowel habits  : NEGATIVE for unusual urinary or vaginal symptoms. Periods are regular.  MUSCULOSKELETAL: NEGATIVE for significant arthralgias or myalgia  NEURO: NEGATIVE for weakness, dizziness or paresthesias  PSYCHIATRIC: NEGATIVE for changes in mood or affect    This document serves as a record of the services and decisions personally performed and made by Maria Elena Root MD. It was created on her behalf by Bina Mukherjee, a trained medical scribe. The creation of this document is based on the provider's statements to the medical scribe.  Bina Mukherjee 11:05 AM March 2, 2020       OBJECTIVE:   /61 (BP Location: Right arm, Patient Position: Chair, Cuff Size: Adult Regular)   Pulse 66   Temp 99.5  F (37.5  C) (Tympanic)   Ht 1.727 m (5' 8\")   Wt 64 kg (141 lb)   SpO2 100%   BMI 21.44 kg/m    Physical Exam  GENERAL: healthy, alert and no distress  EYES: Eyes grossly normal to inspection, PERRL and conjunctivae and sclerae normal  HENT: ear canals and TM's normal, nose and mouth without ulcers or lesions  NECK: no adenopathy, no asymmetry, masses, or scars and thyroid normal to palpation  RESP: lungs clear to auscultation - no rales, rhonchi or wheezes  BREAST: normal without masses, tenderness or nipple discharge and no palpable axillary masses or adenopathy, right breast is slightly larger than " left breast  CV: regular rate and rhythm, normal S1 S2, no S3 or S4, no murmur, click or rub, no peripheral edema and peripheral pulses strong  ABDOMEN: soft, nontender, no hepatosplenomegaly, no masses and bowel sounds normal  MS: no gross musculoskeletal defects noted, no edema  SKIN: no suspicious or rashes, numerous moles and freckles seen upon exam, Follows with Dr. Mullins  NEURO: Normal strength and tone, mentation intact and speech normal  PSYCH: mentation appears normal, affect normal/bright    Diagnostic Test Results:  Labs reviewed in Epic  No results found for this or any previous visit (from the past 24 hour(s)).    Reviewed and discussed labs done on 02/15/2019  Reviewed and discussed mammogram done on 01/22/2020      ASSESSMENT/PLAN:   Domonique was seen today for physical.    Diagnoses and all orders for this visit:    Routine general medical examination at a health care facility  Fasting labs today  UTD on Mammogram  Last mammogram was on 01/22/2020  UTD on Pap Smear  Last Pap smear was on 02/02/2018  Still getting per period, but it is shorter and lasts only 1-2 days  UTD on flu shot  Her father has a stent for heart issues  Her father also has double-kidney failure and is on dialysis  No family history of colon cancer    IUD (intrauterine device) in place  There is a note that her IUD was placed on 04/17/2015  She will call and schedule an appointment with gynecologist regarding this    Screening for thyroid disorder  -     TSH with free T4 reflex    Benign hypertension  -     metoprolol succinate ER (TOPROL-XL) 50 MG 24 hr tablet; Take 1 tablet (50 mg) by mouth daily  -     Basic metabolic panel  Stable  Compliant with medication  This medication is helping keep her BP under control as well as her tremors  propanolol did not work well         BP is on lower side today at 109/61         Pt denies feeling lightheaded or dizzy with this medication   She understands propranolol is the treatment of choice  "for tremors.    Benign essential tremor  -     metoprolol succinate ER (TOPROL-XL) 50 MG 24 hr tablet; Take 1 tablet (50 mg) by mouth daily  See Above    Screening for lipid disorders  -     Lipid panel reflex to direct LDL Fasting    Anxiety  -     LORazepam (ATIVAN) 0.5 MG tablet; Take 1 tablet (0.5 mg) by mouth as needed  Stable  Compliant with medication  Informed pt about the controlled substance restrictions coming up soon  She signed the controlled substance agreement  We educated her on this    Controlled substance agreement signed  Comments:  on lorazepam and uses sparingly  See Above    Numerous moles  Numerous moles and freckles were noticed upon exam  History of basal cell carcinoma  Family history of skin cancer  Follows with a dermatologist  Has an appointment with Dr. Mullins today  Was seeing Dr. Mullins every 6 months, but  gave her the OK to see her after 12 months at her last visit        COUNSELING:  Reviewed preventive health counseling, as reflected in patient instructions       Regular exercise       Healthy diet/nutrition       Immunizations    Vaccinated for: Influenza             Colon cancer screening       (Donna)menopause management    Estimated body mass index is 21.44 kg/m  as calculated from the following:    Height as of this encounter: 1.727 m (5' 8\").    Weight as of this encounter: 64 kg (141 lb).         reports that she has never smoked. She has never used smokeless tobacco.      Counseling Resources:  ATP IV Guidelines  Pooled Cohorts Equation Calculator  Breast Cancer Risk Calculator  FRAX Risk Assessment  ICSI Preventive Guidelines  Dietary Guidelines for Americans, 2010  USDA's MyPlate  ASA Prophylaxis  Lung CA Screening    The information in this document, created by the medical scribe for me, accurately reflects the services I personally performed and the decisions made by me. I have reviewed and approved this document for accuracy prior to leaving the patient care " area.  March 2, 2020 11:16 AM      Maria Elena Root MD  [facility]

## 2020-03-02 NOTE — LETTER
3/2/2020         RE: Domonique Masters  [address]  [place] [address]        Dear Colleague,    Thank you for referring your patient, Domonique Masters, to the [facility] [place]. Please see a copy of my visit note below.    [facility] - PRIMARY CARE SKIN    CC: skin cancer screening (full-body)  SUBJECTIVE:   Domonique Masters is a(n) 45 year old female who presents to clinic today for a full-body skin exam.    No bothersome lesions noticed by the patient or other skin concerns.    Personal Medical History  Skin cancer: YES - basal cell carcinoma on right thigh, basal cell carcinoma on chest     Family Medical History  Skin cancer: YES - squamous cell carcinoma in grandmother     Sun Exposure History  Previous history of significant sun exposure: grew up in [place]  Blistering sunburns: YES - 10 times.  Tanning beds: YES - when younger.  Sunscreen usage: YES, SPF: 15.  UV-protective clothing usage: NO.  Wide-brimmed hat usage: NO.  UV-protective sunglasses usage: YES.  Mid-day sun avoidance: NO.     Occupation: [employer] (both indoor & outdoor).    Refer to electronic medical record (EMR) for past medical history and medications.    INTEGUMENTARY/SKIN: NEGATIVE for worrisome rashes, moles or lesions  ROS: 14 point review of systems was negative except the symptoms listed above in the HPI.    This document serves as a record of the services and decisions personally performed and made by Joanna Mullins MD and was created by Nii Jacobs, a trained medical scribe, based on personal observations and provider statements to the medical scribe.  March 2, 2020 12:18 PM   Nii Jacobs    OBJECTIVE:   GENERAL: healthy, alert and no distress.  HEENT: PERRL. Conjunctiva, sclera clear.  SKIN: Berger Skin Type - I.  This patient was examined from the top of the head to the bottom of the feet  including scalp, face, neck, trunk, buttocks, both arms, both legs, both hands, both feet, and all  "fingers and toes. The dermatoscope was used to help evaluate pigmented lesions.  Skin Pertinent Findings:  Upper extremities: Multiple brown, macule(s) most consistent with benign solar lentigo. brown macules of various sizes and shapes most consistent with (benign) melanocytic nevi.    Anterior trunk: Multiple brown macules of various sizes and shapes most consistent with (benign) melanocytic nevi on the abdomen and chest.    Anterior lower extremities: Multiple brown macules of various sizes and shapes most consistent with (benign) melanocytic nevi.    Left dorsal third toe: 3 mm in size brown macule(s) most consistent with benign nevus(i).     Left dorsal foot overlying fourth metatarsal: brown macule(s) most consistent with benign nevus(i).    Back: Multiple brown macules of various sizes and shapes most consistent with (benign) melanocytic nevi.    Buttocks: Multiple brown macules of various sizes and shapes most consistent with (benign) melanocytic nevi.    Significant Findings:  Right anterior thigh: well-healed scar    ASSESSMENT:     Encounter Diagnoses   Name Primary?     Skin cancer screening Yes     History of basal cell carcinoma      Multiple benign melanocytic nevi          PLAN:   Patient Instructions   FUTURE APPOINTMENTS  Follow up in 1 year(s) for a full-body skin cancer screening.    SUN PROTECTION INSTRUCTIONS  Sun damage can lead to skin cancer and premature aging of the skin.      The best way to protect from sun damage to your skin is to avoid the sun during peak hours (10 am - 2 pm) even on overcast days.    Never use tanning beds. Tanning beds are associated with much higher risks of skin cancer.    All tanning damages the skin. Aim for ivory skin year round and you will have less trouble with your skin in years to come. There is no merit in getting \"a base tan\" before a warm weather vacation, as any tanning indicates your body's response to sun damage.    Stop smoking. Smokers have higher " "rates of skin cancer and also have premature skin wrinkling.    Use UPF sun-protective clothing, which while more expensive initially provides longer lasting coverage without having to worry about remembering to re-apply.  1. Wear a wide-brimmed hat and sunglasses.   2. Wear sun-protective clothing.  [employer] and other [employer] make sun protective clothing that are stylish, comfortable and cool.   [employer] and other [employer] make UV arm sleeves suitable for golfing, gardening and other activities.    Sunscreen instructions:  1. Use sunscreens with Zinc Oxide, Titanium Dioxide or Avobenzone to protect from UVA rays.  2. Use SPF 30-50+ to protect from UVB rays.  3. Re-apply every 2 hours even if water resistant.  4. Apply on your face every day even when cloudy and even in the winter. UVA \"aging rays\" penetrate window glass and are just as strong in the winter as in the summer.    FYI  You should use about 3 tablespoons of sunscreen to protect your whole body. Thus a typical eight ounce bottle of sunscreen should last 4 applications. Remember, that the SPF rating is compromised if you don t apply enough. Most people only apply 1/2 - 1/3 of the amount they need. Also don t forget areas such as your ears, feet, upper back and harder to reach places. Keep in mind that these amounts should be increased for larger body sizes.    Sunscreens with titanium dioxide and/or zinc oxide in the active ingredients are physical blockers as opposed to chemical blockers. Chemical-free sunscreens should not irritate the skin.    Spray-on sunscreens may be used for touch-up application only, not as a base layer. Also, use with caution around small children due to inhalation risk.    SPF means sun protection factor, which is just the degree to which the sunscreen can protect against UVB rays. There is no rating system for UVA rays. SPF is calculated as the time skin will burn when sunscreen is applied vs. skin without " sunscreen.    Water resistant sunscreens should be re-applied every 1-2 hours.    Product Recommendations:    Consider use of sunscreen sticks with Zinc Oxide and Titanium Dioxide active ingredients such as Neutrogena Pure&Free Baby Sunscreen Stick.    Good examples include: Blue Lizard, EltaMD, Solbar    Good daily moisturizers with SPF: Vanicream, CeraVe.    For sensitive skin, consider : SkinMedica Essential Defense Mineral Shield Broad Spectrum SPF 35    Men: consider use of Neutrogena Triple Protect Facial Lotion    Avoid retinyl palmitate products.  Avoid combination products that include both sunscreen and insect repellant, as sunscreen should be applied every 2 hours, but insect repellant should not be applied as frequently.    For more information:  https://www.skincancer.org/prevention/sun-protection/sunscreen/sunscreens-safe-and-effective    SKIN CANCER SELF-EXAM INSTRUCTIONS  Check every month in the mirror or with a household member. Be aware of any changes, especially bleeding or tenderness. Also, make sure to check your nails for color changes after removal of nail polish.    For melanoma, check for:  A - Asymmetry. One half unlike the other half.  B - Border. Irregular, scalloped, ragged, notched, blurred or poorly defined borders.  C - Color. Color variations from one area to another, with shades of tan, brown and/or black present. Sometimes white, red or blue.  D - Diameter. Greater than 6 mm (about the size of a pencil eraser). Any new growth of a mole should be concerning and be evaluated.  E - Evolving. A mole or skin lesion that looks different from the rest or is changing in size, shape or color.    For basal cell carcinoma and squamous cell carcinoma, check for:    Sores, shiny bumps, nodules, scaly lesions, or wart-like growths that are itchy, tender, crusting, scabbing, eroding, oozing or bleeding.    Open sores/wounds or reddish/irritated areas that do not heal within 2-3  weeks.    Scar-like areas that are white, yellow or waxy in color.      The patient was counseled about sunscreens and sun avoidance. The patient was counseled to check the skin regularly and report any lesion that is new, changing, itching, scabbing, bleeding or otherwise bothersome. The patient was discharged ambulatory and in stable condition.    TT: 25 minutes.  CT: 15 minutes.    The information in this document, created by the medical scribe for me, accurately reflects the services I personally performed and the decisions made by me. I have reviewed and approved this document for accuracy prior to leaving the patient care area.  March 2, 2020 12:18 PM  Joanna Mullins MD  [facility]    Again, thank you for allowing me to participate in the care of your patient.        Sincerely,        Joanna Mullins MD

## 2020-03-02 NOTE — PATIENT INSTRUCTIONS
Labs today  Make appointment with gynecology for replacement of your Mirena IUD  [facility] [phone]       Preventive Health Recommendations  Female Ages 40 to 49    Yearly exam:     See your health care provider every year in order to  1. Review health changes.   2. Discuss preventive care.    3. Review your medicines if your doctor prescribed any.      Get a Pap test every three years (unless you have an abnormal result and your provider advises testing more often).      If you get Pap tests with HPV test, you only need to test every 5 years, unless you have an abnormal result. You do not need a Pap test if your uterus was removed (hysterectomy) and you have not had cancer.      You should be tested each year for STDs (sexually transmitted diseases), if you're at risk.     Ask your doctor if you should have a mammogram.      Have a colonoscopy (test for colon cancer) if someone in your family has had colon cancer or polyps before age 50.       Have a cholesterol test every 5 years.       Have a diabetes test (fasting glucose) after age 45. If you are at risk for diabetes, you should have this test every 3 years.    Shots: Get a flu shot each year. Get a tetanus shot every 10 years.     Nutrition:     Eat at least 5 servings of fruits and vegetables each day.    Eat whole-grain bread, whole-wheat pasta and brown rice instead of white grains and rice.    Get adequate Calcium and Vitamin D.      Lifestyle    Exercise at least 150 minutes a week (an average of 30 minutes a day, 5 days a week). This will help you control your weight and prevent disease.    Limit alcohol to one drink per day.    No smoking.     Wear sunscreen to prevent skin cancer.    See your dentist every six months for an exam and cleaning.

## 2020-03-03 NOTE — RESULT ENCOUNTER NOTE
Diann Youssef,    This is to inform you regarding your test result.    TSH which is thyroid hormone is minimally elevated but free T 4 level is normal  You do not need medication at this point.  But we need to check your level again in 3 months  Your total cholesterol is normal.  HDL which is called good cholesterol is normal.  Your LDL cholesterol is normal.  This is often call bad cholesterol and high levels increase the risk for heart attacks and strokes.  Your triglycerides are normal.  Basic metabolic panel which includes electrolytes and blood sugar is normal.      Sincerely,      Dr.Nasima Ivett MD,FACP

## 2020-04-07 ENCOUNTER — TELEPHONE (OUTPATIENT)
Dept: OBGYN | Facility: CLINIC | Age: 45
End: 2020-04-07

## 2020-04-07 NOTE — TELEPHONE ENCOUNTER
Patient had an IUD Insert on 4/17/15 and wants to know How long she can wait till she can schedule here in our office with anyone? Please call patient. She is a [facility] pt but would be new here.

## 2020-04-07 NOTE — TELEPHONE ENCOUNTER
Called and left a detailed message w info about mirena IUD - good for 5 yrs. No increase risks of infection for leaving until June/July when we are booking out at this time. She can call to schedule replacement with GYN or midwives.  Call back w any further questions.  Mari Montero RN on 4/7/2020 at 3:18 PM

## 2020-11-22 ENCOUNTER — HEALTH MAINTENANCE LETTER (OUTPATIENT)
Age: 45
End: 2020-11-22

## 2021-03-12 NOTE — LETTER
[facility]  03/02/20    Patient: Domonique Masters  YOB: 1975  Medical Record Number: [id number]  CSN: [id number]                                                                              Non-opioid Controlled Substance Agreement    I understand that my care provider has prescribed a controlled substance to help manage my condition(s). I am taking this medicine to help me function or work. I know this is strong medicine, and that it can cause serious side effects. Controlled substances can be sedating, addicting and may cause a dependency on the drug. They can affect my ability to drive or think, and cause depression. They need to be taken exactly as prescribed. Combining controlled substances with certain medicines or chemicals (such as cocaine, sedatives and tranquilizers, sleeping pills, meth) can be dangerous or even fatal. Also, if I stop controlled substances suddenly, I may have severe withdrawal symptoms.  If not helpful, I may be asked to stop them.    The risks, benefits, and side effects of these medicine(s) were explained to me. I agree that:    1. I will take part in other treatments as advised by my care team. This may be psychiatry or counseling, physical therapy, behavioral therapy, group treatment or a referral to a pain clinic. I will reduce or stop my medicine when my care team tells me to do so.  2. I will take my medicines as prescribed. I will not change the dose or schedule unless my care team tells me to. There will be no refills if I  run out early.   I may be contactedwithout warning and asked to complete a urine drug test or pill count at any time.   3. I will keep all my appointments, and understand this is part of the monitoring of controlled substances. My care team may require an office visit for EVERY controlled substance refill. If I miss appointments or don t follow instructions, my care team may stop my medicine.  4. I will not ask other providers to prescribe  controlled substances, and I will not accept controlled substances from other people. If I need another prescribed controlled substance for a new reason, I will tell my care team within 1 business day.  5. I will use one pharmacy to fill all of my controlled substance prescriptions, and it is up to me to make sure that I do not run out of my medicines on weekends or holidays. If my care team is willing to refill my controlled substance prescription without a visit, I must request refills only during office hours, refills may take up to 3 days to process, and it may take up to 5 to 7 days for my medicine to be mailed and ready at my pharmacy. Prescriptions will not be mailed anywhere except my pharmacy.    6. I am responsible for my prescriptions. If the medicine/prescription is lost or stolen, it will not be replaced. I also agree not to share controlled substance medicines with anyone.              [facility]  03/02/20  Patient:  Domonique Masters  YOB: 1975  Medical Record Number: [id number]  CSN: [id number]    7. I agree to not use ANY illegal or recreational drugs. This includes marijuana, cocaine, bath salts or other drugs. I agree not to use alcohol unless my care team says I may. I agree to give urine samples whenever asked. If I don t give a urine sample, the care team may stop my medicine.    8. If I enroll in the [place] Medical Marijuana program, I will tell my care team. I will also sign an agreement to share my medical records with my care team.    9. I will bring in my list of medicines (or my medicine bottles) each time I come to the clinic.   10. I will tell my care team right away if I become pregnant or have a new medical problem treated outside of my regular clinic.  11. I understand that this medicine can affect my thinking and judgment. It may be unsafe for me to drive, use machinery and do dangerous tasks. I will not do any of these things until I know how the medicine  affects me. If my dose changes, I will wait to see how it affects me. I will contact my care team if I have concerns about medicine side effects.    I understand that if I do not follow any of the conditions above, my prescriptions or treatment may be stopped.      I agree that my provider, clinic care team, and pharmacy may work with any city, state or federal law enforcement agency that investigates the misuse, sale, or other diversion of my controlled medicine. I will allow my provider to discuss my care with or share a copy of this agreement with any other treating provider, pharmacy or emergency room where I receive care. I agree to give up (waive) any right of privacy or confidentiality with respect to these consents.   I have read this agreement and have asked questions about anything I did not understand.    ____________________________________________________    ____________  ________  Patient signature                                                         Date      Time    ____________________________________________________     ____________  ________  Witness                                                          Date      Time    ____________________________________________________  Provider signature   no

## 2021-04-04 ENCOUNTER — HEALTH MAINTENANCE LETTER (OUTPATIENT)
Age: 46
End: 2021-04-04

## 2021-09-19 ENCOUNTER — HEALTH MAINTENANCE LETTER (OUTPATIENT)
Age: 46
End: 2021-09-19

## 2022-04-30 ENCOUNTER — HEALTH MAINTENANCE LETTER (OUTPATIENT)
Age: 47
End: 2022-04-30

## 2022-11-20 ENCOUNTER — HEALTH MAINTENANCE LETTER (OUTPATIENT)
Age: 47
End: 2022-11-20

## 2023-06-02 ENCOUNTER — HEALTH MAINTENANCE LETTER (OUTPATIENT)
Age: 48
End: 2023-06-02